# Patient Record
Sex: FEMALE | Race: WHITE
[De-identification: names, ages, dates, MRNs, and addresses within clinical notes are randomized per-mention and may not be internally consistent; named-entity substitution may affect disease eponyms.]

---

## 2019-07-13 ENCOUNTER — HOSPITAL ENCOUNTER (INPATIENT)
Dept: HOSPITAL 74 - JER | Age: 64
LOS: 6 days | Discharge: HOME | DRG: 871 | End: 2019-07-19
Attending: INTERNAL MEDICINE | Admitting: INTERNAL MEDICINE
Payer: COMMERCIAL

## 2019-07-13 VITALS — BODY MASS INDEX: 22.4 KG/M2

## 2019-07-13 DIAGNOSIS — A60.04: ICD-10-CM

## 2019-07-13 DIAGNOSIS — D64.9: ICD-10-CM

## 2019-07-13 DIAGNOSIS — E86.1: ICD-10-CM

## 2019-07-13 DIAGNOSIS — A41.9: Primary | ICD-10-CM

## 2019-07-13 DIAGNOSIS — I27.20: ICD-10-CM

## 2019-07-13 DIAGNOSIS — J18.9: ICD-10-CM

## 2019-07-13 DIAGNOSIS — J98.11: ICD-10-CM

## 2019-07-13 DIAGNOSIS — Z87.891: ICD-10-CM

## 2019-07-13 DIAGNOSIS — R01.1: ICD-10-CM

## 2019-07-13 DIAGNOSIS — R00.0: ICD-10-CM

## 2019-07-13 DIAGNOSIS — R50.9: ICD-10-CM

## 2019-07-13 DIAGNOSIS — J90: ICD-10-CM

## 2019-07-13 DIAGNOSIS — R11.10: ICD-10-CM

## 2019-07-13 DIAGNOSIS — E87.1: ICD-10-CM

## 2019-07-13 DIAGNOSIS — I08.1: ICD-10-CM

## 2019-07-13 DIAGNOSIS — D47.3: ICD-10-CM

## 2019-07-13 DIAGNOSIS — D72.829: ICD-10-CM

## 2019-07-13 LAB
ALBUMIN SERPL-MCNC: 2.7 G/DL (ref 3.4–5)
ALP SERPL-CCNC: 112 U/L (ref 45–117)
ALT SERPL-CCNC: 49 U/L (ref 13–61)
ANION GAP SERPL CALC-SCNC: 5 MMOL/L (ref 8–16)
ANION GAP SERPL CALC-SCNC: 7 MMOL/L (ref 8–16)
ANISOCYTOSIS BLD QL: 0
APPEARANCE UR: CLEAR
APTT BLD: 31.5 SECONDS (ref 25.2–36.5)
AST SERPL-CCNC: 40 U/L (ref 15–37)
BACTERIA # UR AUTO: 9.5 /HPF
BASOPHILS # BLD: 0 % (ref 0–2)
BILIRUB SERPL-MCNC: 0.2 MG/DL (ref 0.2–1)
BILIRUB UR STRIP.AUTO-MCNC: NEGATIVE MG/DL
BNP SERPL-MCNC: 1753.2 PG/ML (ref 5–125)
BUN SERPL-MCNC: 10.2 MG/DL (ref 7–18)
BUN SERPL-MCNC: 10.4 MG/DL (ref 7–18)
CALCIUM SERPL-MCNC: 9.3 MG/DL (ref 8.5–10.1)
CALCIUM SERPL-MCNC: 9.5 MG/DL (ref 8.5–10.1)
CASTS URNS QL MICRO: 4 /LPF (ref 0–8)
CHLORIDE SERPL-SCNC: 94 MMOL/L (ref 98–107)
CHLORIDE SERPL-SCNC: 95 MMOL/L (ref 98–107)
CO2 SERPL-SCNC: 27 MMOL/L (ref 21–32)
CO2 SERPL-SCNC: 28 MMOL/L (ref 21–32)
COLOR UR: YELLOW
CREAT SERPL-MCNC: 0.6 MG/DL (ref 0.55–1.3)
CREAT SERPL-MCNC: 0.7 MG/DL (ref 0.55–1.3)
DEPRECATED RDW RBC AUTO: 12.4 % (ref 11.6–15.6)
EOSINOPHIL # BLD: 0 % (ref 0–4.5)
EPITH CASTS URNS QL MICRO: 3.3 /HPF
GLUCOSE SERPL-MCNC: 115 MG/DL (ref 74–106)
GLUCOSE SERPL-MCNC: 129 MG/DL (ref 74–106)
HCT VFR BLD CALC: 29.7 % (ref 32.4–45.2)
HGB BLD-MCNC: 10.2 GM/DL (ref 10.7–15.3)
INR BLD: 1.19 (ref 0.83–1.09)
KETONES UR QL STRIP: (no result)
LEUKOCYTE ESTERASE UR QL STRIP.AUTO: NEGATIVE
LYMPHOCYTES # BLD: 3.3 % (ref 8–40)
MACROCYTES BLD QL: (no result)
MCH RBC QN AUTO: 32.1 PG (ref 25.7–33.7)
MCHC RBC AUTO-ENTMCNC: 34.5 G/DL (ref 32–36)
MCV RBC: 93.1 FL (ref 80–96)
MONOCYTES # BLD AUTO: 1.9 % (ref 3.8–10.2)
NEUTROPHILS # BLD: 94.8 % (ref 42.8–82.8)
NITRITE UR QL STRIP: NEGATIVE
OVALOCYTES BLD QL SMEAR: (no result)
PH UR: 7 [PH] (ref 5–8)
PLATELET # BLD AUTO: 330 K/MM3 (ref 134–434)
PLATELET BLD QL SMEAR: NORMAL
PMV BLD: 8 FL (ref 7.5–11.1)
POTASSIUM SERPLBLD-SCNC: 3.9 MMOL/L (ref 3.5–5.1)
POTASSIUM SERPLBLD-SCNC: 4.4 MMOL/L (ref 3.5–5.1)
PROT SERPL-MCNC: 6.2 G/DL (ref 6.4–8.2)
PROT UR QL STRIP: (no result)
PROT UR QL STRIP: NEGATIVE
PT PNL PPP: 14.1 SEC (ref 9.7–13)
RBC # BLD AUTO: 3.19 M/MM3 (ref 3.6–5.2)
RBC # BLD AUTO: 6 /HPF (ref 0–4)
SODIUM SERPL-SCNC: 128 MMOL/L (ref 136–145)
SODIUM SERPL-SCNC: 128 MMOL/L (ref 136–145)
SP GR UR: 1.02 (ref 1.01–1.03)
UROBILINOGEN UR STRIP-MCNC: 1 MG/DL (ref 0.2–1)
WBC # BLD AUTO: 11.8 K/MM3 (ref 4–10)
WBC # UR AUTO: 2 /HPF (ref 0–5)

## 2019-07-13 NOTE — PDOC
Documentation entered by Christen Ruth SCRIBE, acting as scribe for Alex Obregon MD.








Alex Obregon MD:  This documentation has been prepared by the Flory westbrook Brenda, SCRIBE, under my direction and personally reviewed by me in its 

entirety.  I confirm that the documentation accurately reflects all work, 

treatment, procedures, and medical decision making performed by me.  





Attending Attestation





- Resident


Resident Name: iRcardo Keller





- ED Attending Attestation


I have performed the following: I have examined & evaluated the patient, The 

case was reviewed & discussed with the resident, I agree w/resident's findings 

& plan, Exceptions are as noted





- HPI


HPI: 





07/13/19 12:32


The patient is a 63 year old female, with a significant PMH of Scarlet fever, 

vagincal herpies and an unknown murmor, who presents to the emergency 

department with 1 week of body aches,headaches, chest congestion,  a productive 

cough of white phlegm accompanied by chills, SOB and orthopnea. She also 

endorses experiencing fevers at about 101.5 F and this morning at 102.5 f. The 

patient reported seeing her PCP on Thursday for her symptoms,but was told to 

wait it off, due to son experiencing similar symptoms, but prescribed her Z-

pack. She also reports recent decreased urine production, for which she took 

husbands lasix, which helped for 1 day.  The patient also endorses recent 

loose stools. 





The patient denies chest pain and dizziness. Denies nausea, vomiting, and 

constipation. Denies any urinary symptoms. 





Allergies: NKA


Past surgical history: Appendectomy


Social history: Former Smoker, works as a  in [employer]. 


PCP: Christen Jimenez








- Physicial Exam


PE: 





07/13/19 12:33


GENERAL: + Mild tachypnea. The patient is awake, alert, and fully oriented, 

Nontoxic - in no acute distress.


HEAD: Normocephalic, atraumatic.


EYES: extraocular movements intact, sclera anicteric, conjunctiva clear.


ENT: Normal voice,  Moist mucous membranes.


NECK: Normal range of motion, supple without lymphadenopathy, JVD, or masses.


LUNGS: + basilar rales, worse in L base 


HEART: +Tachycardic. Regular rhythm, normal S1 and S2 without murmur, rub or 

gallop.


ABDOMEN: Soft, nontender, No guarding, no rebound.  No masses.


EXTREMITIES: Normal range of motion, no edema.  neg homans, no calf tenderness


NEUROLOGICAL: No facial asymmetry, Normal speech, normal gait. moving all 4 ext 

spontaneously and symmetrically 


PSYCH: Normal mood, normal affect.


SKIN: Warm, Dry, normal turgor, no rashes or lesions noted.











- Medical Decision Making





07/13/19 12:12


63y F no significant pmhx presnts with 1 week of fever/chills, cough, chgest 

congestion. cough producive of yellowish without hemopytsis. patient notes that 

she started off with fever, body aches approximate 6 days ago with onset of 

cough about 5 days ago - patient note poor appetite, malaise, dyspnea on 

exertion. the patient denies any leg swelling, abdominal pain, vomiting. no 

recent travel. she has had a few friends that had a mild cough.  Patient denies 

smoking or recreational drug use





pmd: dr. jimenez





Suspect possible pneumonia


we'll obtain blood work, chest x-ray, sepsis or set obtained








07/13/19 13:08


Chest x-ray noted for left basal infiltrate





We'll treat the patient with ceftriaxone and azithromycin for CAP





will adimt for inpt tx of pna per PSI Port score








**Heart Score/ECG Review





- ECG Impressions


Comment:: 





07/13/19 13:08


Twelve-lead EKG was performed and reviewed by me. 


There is normal sinus rhythm with a rate of 118


normal axis


no st wave changes suggestive of acute ischemia


imp: sinus tachycarda

## 2019-07-13 NOTE — HP
CHIEF COMPLAINT:cough, SOB





PCP:Dr. Jimenez





HISTORY OF PRESENT ILLNESS:


Patient is a 63 year old female with past medical history of heart murmur and 

vaginal herpes, presented to the ED due to 1 week history of cough and 

worsening SOB. Patient reported she experienced nonproductive cough, body 

malaise, subjective fevers and headache that started 1 week ago. No medications 

taken, no consult was done. Four days ago, she started experiencing shortness 

of breath, worsened with exertion, and had poor oral intake. She also started 

to sleep with head elevated as lying flat makes her cough and dyspneic. Two 

days ago, she followed up with her PCP, and she was prescribed Azithromycin and 

Prednisone taper which she started taking yesterday. This morning, patient woke 

up feeling worse, with temp at 102F, and feeling short of breath. She called 

her PCP and recommended she come to the ED. 


Of note, patient also reports decreased urine output for the past 2 days. She 

reports bloating and incomplete bladder emptying sensation, but denies dysuria 

or hematuria. She took Lasix 20mg from her , and reported good urine 

output afterwards, but today, urine output is decreased again. 





ER course was notable for:


(1)CXR: LLL infiltrates. RUL infiltrate vs atelectasis


(2)IV Azithromycin and IV Ceftriaxone x1, IV NS x1


(3)WBC 11.8, Na 128, BNP 1753 





Recent Travel:denies





PAST MEDICAL HISTORY:


unknown heart murmur


vaginal herpes





PAST SURGICAL HISTORY:


appendectomy





Social History:


Smoking:denies


Alcohol:1 glass of wine/day


Drugs: denies





Family History:


Mother, aunt - colon cancer





Allergies





No Known Allergies Allergy (Verified 07/13/19 09:59)


 








HOME MEDICATIONS:


REVIEW OF SYSTEMS


CONSTITUTIONAL:  fever, generalized weakness, malaise, loss of appetite


Absent: chills, diaphoresis, weight change


HEENT: 


Absent:  rhinorrhea, nasal congestion, throat pain, throat swelling, difficulty 

swallowing, mouth swelling, ear pain, eye pain, visual changes


CARDIOVASCULAR: 


Absent: chest pain, syncope, palpitations, irregular heart rate, lightheadedness

, peripheral edema


RESPIRATORY: cough, shortness of breath


Absent: dyspnea with exertion, orthopnea, wheezing, stridor, hemoptysis


GASTROINTESTINAL:


Absent: abdominal pain, abdominal distension, nausea, vomiting, diarrhea, 

constipation, melena, hematochezia


GENITOURINARY: incomplete bladder emptying


Absent: dysuria, frequency, urgency, hesitancy, hematuria, flank pain, genital 

pain


MUSCULOSKELETAL: 


Absent: myalgia, arthralgia, joint swelling, back pain, neck pain


SKIN: 


Absent: rash, itching, pallor


HEMATOLOGIC/IMMUNOLOGIC: 


Absent: easy bleeding, easy bruising, lymphadenopathy, frequent infections


ENDOCRINE:


Absent: unexplained weight gain, unexplained weight loss, heat intolerance, 

cold intolerance


NEUROLOGIC: 


Absent: headache, focal weakness or paresthesias, dizziness, unsteady gait, 

seizure, mental status changes, bladder or bowel incontinence


PSYCHIATRIC: 


Absent: anxiety, depression, suicidal or homicidal ideation, hallucinations.








PHYSICAL EXAMINATION


 Vital Signs - 24 hr











  07/13/19





  09:59


 


Temperature 101.9 F H


 


Pulse Rate 125 H


 


Respiratory 25 H





Rate 


 


Blood Pressure 147/77


 


O2 Sat by Pulse 97





Oximetry (%) 











GENERAL: Awake, alert, and fully oriented, in no acute distress.


EYES: PERRLa, EOMI, sclera anicteric, conjunctiva clear.


EARS, NOSE, THROAT: Dry mucous membranes.


NECK: Normal range of motion, supple.


LUNGS: + fine crackles on left base


HEART: Regular rate and rhythm, normal S1 and S2, +holosystolic murmur RUSB/LUSB


ABDOMEN: Soft, nontender, not distended, normoactive bowel sounds. 


MUSCULOSKELETAL: Normal range of motion at all joints. 


UPPER EXTREMITIES: 2+ pulses, warm, well-perfused. No peripheral edema.


LOWER EXTREMITIES: 2+ pulses, warm, well-perfused. No peripheral edema. 


NEUROLOGICAL:  Cranial nerves II-XII intact. Normal speech. Normal gait.


PSYCHIATRIC: Cooperative. Good eye contact. Appropriate mood and affect.


SKIN: Warm, dry, normal turgor, no rashes or lesions noted.








 Laboratory Results - last 24 hr











  07/13/19 07/13/19 07/13/19





  11:00 11:00 11:00


 


WBC  11.8 H  


 


RBC  3.19 L  


 


Hgb  10.2 L  


 


Hct  29.7 L  


 


MCV  93.1  


 


MCH  32.1  


 


MCHC  34.5  


 


RDW  12.4  


 


Plt Count  330  


 


MPV  8.0  


 


Absolute Neuts (auto)  11.2 H  


 


Neutrophils %  94.8 H  


 


Lymphocytes %  3.3 L  


 


Monocytes %  1.9 L  


 


Eosinophils %  0.0  


 


Basophils %  0.0  


 


Nucleated RBC %  0  


 


PT with INR   14.10 H 


 


INR   1.19 H 


 


PTT (Actin FS)   31.5 


 


Sodium    128 L


 


Potassium    3.9


 


Chloride    94 L


 


Carbon Dioxide    27


 


Anion Gap    7 L


 


BUN    10.2


 


Creatinine    0.6


 


Est GFR (CKD-EPI)AfAm    114.02


 


Est GFR (CKD-EPI)NonAf    98.38


 


Random Glucose    115 H


 


Lactic Acid   


 


Calcium    9.5


 


Total Bilirubin    0.2


 


AST    40 H


 


ALT    49


 


Alkaline Phosphatase    112


 


Troponin I   


 


B-Natriuretic Peptide   


 


Total Protein    6.2 L


 


Albumin    2.7 L


 


Urine Color   


 


Urine Appearance   


 


Urine pH   


 


Ur Specific Gravity   


 


Urine Protein   


 


Urine Glucose (UA)   


 


Urine Ketones   


 


Urine Blood   


 


Urine Nitrite   


 


Urine Bilirubin   


 


Urine Urobilinogen   


 


Ur Leukocyte Esterase   


 


Urine WBC (Auto)   


 


Urine RBC (Auto)   


 


Urine Casts (Auto)   


 


U Epithel Cells (Auto)   


 


Urine Bacteria (Auto)   














  07/13/19 07/13/19 07/13/19





  11:00 11:00 11:20


 


WBC   


 


RBC   


 


Hgb   


 


Hct   


 


MCV   


 


MCH   


 


MCHC   


 


RDW   


 


Plt Count   


 


MPV   


 


Absolute Neuts (auto)   


 


Neutrophils %   


 


Lymphocytes %   


 


Monocytes %   


 


Eosinophils %   


 


Basophils %   


 


Nucleated RBC %   


 


PT with INR   


 


INR   


 


PTT (Actin FS)   


 


Sodium   


 


Potassium   


 


Chloride   


 


Carbon Dioxide   


 


Anion Gap   


 


BUN   


 


Creatinine   


 


Est GFR (CKD-EPI)AfAm   


 


Est GFR (CKD-EPI)NonAf   


 


Random Glucose   


 


Lactic Acid  0.9  


 


Calcium   


 


Total Bilirubin   


 


AST   


 


ALT   


 


Alkaline Phosphatase   


 


Troponin I   < 0.02 


 


B-Natriuretic Peptide   1753.2 H 


 


Total Protein   


 


Albumin   


 


Urine Color    Yellow


 


Urine Appearance    Clear


 


Urine pH    7.0


 


Ur Specific Gravity    1.021


 


Urine Protein    2+ H


 


Urine Glucose (UA)    Negative


 


Urine Ketones    Trace H


 


Urine Blood    Negative


 


Urine Nitrite    Negative


 


Urine Bilirubin    Negative


 


Urine Urobilinogen    1.0


 


Ur Leukocyte Esterase    Negative


 


Urine WBC (Auto)    2


 


Urine RBC (Auto)    6


 


Urine Casts (Auto)    4


 


U Epithel Cells (Auto)    3.3


 


Urine Bacteria (Auto)    9.5











ASSESSMENT/PLAN:


Patient is a 63 year old female with past medical history of heart murmur and 

vaginal herpes, presented to the ED due to 1 week history of cough and 

worsening SOB. 





#Sepsis 2/2 Community Acquired Pneumonia


-Leukocytosis, fever, tachycardia


-CXR: LLL infiltrate, ?RUL infiltrate


-Urine legionella


-Continue Iv Ceftriaxone 1000mg daily and IV Azithromycin 500mg daily


-IV NS @100cc/hr


-Follow up cultures





#Hyponatremia


-likely 2/2 poor oral intake vs infection


-Urine Na, crea, osm


-Will replete with IV NS 


-Monitor Na level





#Decreased urine output


-Will order bladder scan post void


-likely 2/2 poor oral intake





#?heart failure


-Will order echo


-BNP 1700


-clinical picture more consistent with PNA than CHF





#FEN


-IV NS @100cc/hr


-Routine bmp monitoring


-Sodium restricted diet





#Prophylaxis


-Lovenox 40mg sq daily





#Disposition


-full code


-admit to med surg





Visit type





- Emergency Visit


Emergency Visit: Yes


ED Registration Date: 07/13/19


Care time: The patient presented to the Emergency Department on the above date 

and was hospitalized for further evaluation of their emergent condition.





- New Patient


This patient is new to me today: Yes


Date on this admission: 07/13/19





- Critical Care


Critical Care patient: No





ATTENDING PHYSICIAN STATEMENT





I saw and evaluated the patient.


I reviewed the resident's note and discussed the case with the resident.


I agree with the resident's findings and plan as documented.








SUBJECTIVE:








OBJECTIVE:








ASSESSMENT AND PLAN:

## 2019-07-13 NOTE — PDOC
History of Present Illness





- General


Chief Complaint: SIRS, Suspected/Possible


Stated Complaint: PCP SENT


Time Seen by Provider: 07/13/19 10:41


History Source: Patient


Exam Limitations: No Limitations





- History of Present Illness


Initial Comments: 





07/13/19 10:46


64 yo female pmh scarlet fever and vaginal herpes 





presents for 1 week body aches, HA, F/C, cough with yellow fl, SOB with talking

, worse lying flat, decreased urine output even though drinking water. took 

husbands lasix 20 pm 





102.5 t max





Prescribed z pack and prednisone. Took 1 dose medrol pack, no antibiotics taken 

yet





son had similar symptoms 1 week ago, no recent travel





tachy, bilateral crackles, 101.9 temp in the ED




















Past History





- Past Medical History


Allergies/Adverse Reactions: 


 Allergies











Allergy/AdvReac Type Severity Reaction Status Date / Time


 


No Known Allergies Allergy   Verified 07/13/19 09:59











COPD: No


Other medical history: herpies





- Surgical History


Appendectomy: Yes





- Suicide/Smoking/Psychosocial Hx


Smoking History: Former smoker


Have you smoked in the past 12 months: No


Information on smoking cessation initiated: No


Hx Alcohol Use: No


Drug/Substance Use Hx: No





*Physical Exam





- Vital Signs


 Last Vital Signs











Temp Pulse Resp BP Pulse Ox


 


 101.9 F H  125 H  25 H  147/77   97 


 


 07/13/19 09:59  07/13/19 09:59  07/13/19 09:59  07/13/19 09:59  07/13/19 09:59














ED Treatment Course





- LABORATORY


CBC & Chemistry Diagram: 


 07/13/19 11:00





 07/13/19 11:00





*DC/Admit/Observation/Transfer


Diagnosis at time of Disposition: 


 Community acquired bacterial pneumonia








- Discharge Dispostion


Condition at time of disposition: Stable


Decision to Admit order: Yes





- Referrals


Referrals: 


Christen Jimenez MD [Primary Care Provider] - 





- Patient Instructions





- Post Discharge Activity

## 2019-07-13 NOTE — EKG
Test Reason : 

Blood Pressure : ***/*** mmHG

Vent. Rate : 118 BPM     Atrial Rate : 118 BPM

   P-R Int : 154 ms          QRS Dur : 068 ms

    QT Int : 286 ms       P-R-T Axes : 046 037 037 degrees

   QTc Int : 400 ms

 

SINUS TACHYCARDIA

POSSIBLE LEFT ATRIAL ENLARGEMENT

BORDERLINE ECG

NO PREVIOUS ECGS AVAILABLE

Confirmed by FREDRICK DEMPSEY, RISHI ([id number]) on 7/13/2019 4:09:05 PM

 

Referred By:             Confirmed By:RISHI ALCALA MD

## 2019-07-13 NOTE — PN
Teaching Attending Note


Name of Resident: Heather Westbrook





ATTENDING PHYSICIAN STATEMENT





I saw and evaluated the patient.


I reviewed the resident's note and discussed the case with the resident.


I agree with the resident's findings and plan as documented.








SUBJECTIVE:


CC: fever, cough, and SOB. 


HPI: 64 y/o lady with h/o heart murmur,anemia, and genital herpes who presented 

with fever, cough, and SOB x 1 week. 





patient was doing well until she started with malaise, SOB, cough ( 

occasionally with yellow sputum production). diarrhea developed on second day 

of sx onset, after taking a natural remedies for constipation and has been 

having 3-4 water BMs daily. she feels bloated. she reports decreased urine 

output.  Tokk a lsix form her  yesterday yesterday with transient 

increase in her urine. she denies hematuria. she reports increased thirst and 

slightly increased water intake in past week. poor  food intake. denies any 

heart history but knows of a murmur since childhood. Had previous echos in past 

and was told "the murmur is minimal".


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 Son was sick with fever last week for few 

days then it resolved. no other sick contact. 


she saw her PCp on Thursday and was prescribed Zpack and prednisone. she took 

those yesterday. No change in status though. this am , she felt worse nad 

presented to ER. send sputum cx 





In ER, she was given IVF,, CTx and Azithro. blood cx was sent. she feels better 

now and SOB is slightly better.








OBJECTIVE:


NAD. 


HEENT: dry MM, + JVD, no facial droop. erythematous oropharynx, no facial droop

, round pupils, reactive to light, unicteric sclera. EOMI


CV: RRR, 2/6 Sm at Base, and LLSB, with no radiation to carotids . B/l JVD 


Lungs: L mid lung and L base crackles. R base crackles 


Ext: no nellie or erythema, no fungal infection in toe webs. 


Abd:Soft, Nd,, TTP in epigastric area and RUQ. No rebound tenderness. No 

hepartosplenomegaly. blader is not percussed or palpated. 








Cxray image and report reviewed. 


EKG: sinus tach, no ST or TW changes. 





ASSESSMENT AND PLAN:





64 y/o lady with h/o heart murmur,anemia, and genital herpes who presented with 

fever, cough, and SOB x 1 week. she was found ot be septic 





1- Sepsis: due to b/l community acquired  PNA. R/o legionella with bilateral 

infiltrates, hyponatremia and tenderness over liver area. 


Despite JVds, and elevated BNP, patient looks volume depleted. i doubt heart 

failure. 


- cont ceftriaxone and Azithro. Qtc 400


- check legionella Urine Ag.


- follow urine cx 


- give IVF carefully and monitor resp status. if signs of volume overload can 

diurese 


- check Echo


- order sputum cx 





2- Hyponatremia: suspect form volume depletion and poor po intake. r/o 

legionella infection. doubt CHF. No previous labsin system


- check urine Na/cr/Osm, and Serum Osm. 


- IVF NS for now 


- repeat Na this evening 





3- Normocytic anemia: reports being anemic in past, but no specific treatment 

was provided. No GI or  bleed 


- check iron studies, B12, folate 





4- Elevated BNP: with JVd on exam. doubt CHF. 


- check echo. monitor closely on IVF





5- Reported decreased urine output. probably due to volume depletion.No  signs 

of UTI 


- check bladder scan. R/o retention 


- I&O 


 





6- DVT px. lovenox

## 2019-07-14 LAB
ALBUMIN SERPL-MCNC: 2.5 G/DL (ref 3.4–5)
ALP SERPL-CCNC: 96 U/L (ref 45–117)
ALT SERPL-CCNC: 49 U/L (ref 13–61)
ANION GAP SERPL CALC-SCNC: 6 MMOL/L (ref 8–16)
AST SERPL-CCNC: 56 U/L (ref 15–37)
BASOPHILS # BLD: 0.2 % (ref 0–2)
BILIRUB SERPL-MCNC: 0.2 MG/DL (ref 0.2–1)
BUN SERPL-MCNC: 8.9 MG/DL (ref 7–18)
CALCIUM SERPL-MCNC: 9.1 MG/DL (ref 8.5–10.1)
CHLORIDE SERPL-SCNC: 100 MMOL/L (ref 98–107)
CO2 SERPL-SCNC: 29 MMOL/L (ref 21–32)
CREAT SERPL-MCNC: 0.6 MG/DL (ref 0.55–1.3)
DEPRECATED RDW RBC AUTO: 12.3 % (ref 11.6–15.6)
EOSINOPHIL # BLD: 0.3 % (ref 0–4.5)
GLUCOSE SERPL-MCNC: 103 MG/DL (ref 74–106)
HCT VFR BLD CALC: 29.9 % (ref 32.4–45.2)
HGB BLD-MCNC: 10.2 GM/DL (ref 10.7–15.3)
LYMPHOCYTES # BLD: 9.9 % (ref 8–40)
MAGNESIUM SERPL-MCNC: 2.2 MG/DL (ref 1.8–2.4)
MCH RBC QN AUTO: 32.3 PG (ref 25.7–33.7)
MCHC RBC AUTO-ENTMCNC: 34.2 G/DL (ref 32–36)
MCV RBC: 94.4 FL (ref 80–96)
MONOCYTES # BLD AUTO: 4.6 % (ref 3.8–10.2)
NEUTROPHILS # BLD: 85 % (ref 42.8–82.8)
PHOSPHATE SERPL-MCNC: 3 MG/DL (ref 2.5–4.9)
PLATELET # BLD AUTO: 329 K/MM3 (ref 134–434)
PMV BLD: 7.3 FL (ref 7.5–11.1)
POTASSIUM SERPLBLD-SCNC: 3.7 MMOL/L (ref 3.5–5.1)
PROT SERPL-MCNC: 5.9 G/DL (ref 6.4–8.2)
RBC # BLD AUTO: 3.16 M/MM3 (ref 3.6–5.2)
SODIUM SERPL-SCNC: 135 MMOL/L (ref 136–145)
WBC # BLD AUTO: 7.5 K/MM3 (ref 4–10)

## 2019-07-14 RX ADMIN — ENOXAPARIN SODIUM SCH MG: 40 INJECTION SUBCUTANEOUS at 09:59

## 2019-07-14 RX ADMIN — SODIUM CHLORIDE SCH MLS/HR: 9 INJECTION, SOLUTION INTRAVENOUS at 14:00

## 2019-07-14 RX ADMIN — AZITHROMYCIN DIHYDRATE SCH MLS/HR: 500 INJECTION, POWDER, LYOPHILIZED, FOR SOLUTION INTRAVENOUS at 09:59

## 2019-07-14 RX ADMIN — GUAIFENESIN AND CODEINE PHOSPHATE PRN ML: 10; 100 LIQUID ORAL at 21:46

## 2019-07-14 RX ADMIN — IPRATROPIUM BROMIDE AND ALBUTEROL SULFATE PRN AMP: .5; 3 SOLUTION RESPIRATORY (INHALATION) at 16:00

## 2019-07-14 RX ADMIN — IPRATROPIUM BROMIDE AND ALBUTEROL SULFATE PRN AMP: .5; 3 SOLUTION RESPIRATORY (INHALATION) at 20:30

## 2019-07-14 RX ADMIN — GUAIFENESIN AND CODEINE PHOSPHATE PRN ML: 10; 100 LIQUID ORAL at 14:26

## 2019-07-14 NOTE — PN
Progress Note (short form)





- Note


Progress Note: 





Paged by nurse that patient was febrile at 103F. Patient reported coughing 

which keeps her awake.


Patient noted to be tachycardic at 120s.





General: awake, alert, cotinuously having nonproductive cough


Heart: Tachycardic


Lungs: crackles left lung base 





Repeat BNP and bmp 


EKG showed sinus tachycardia


IVF discontinued


Repeat blood culture

## 2019-07-14 NOTE — EKG
Test Reason : 

Blood Pressure : ***/*** mmHG

Vent. Rate : 102 BPM     Atrial Rate : 102 BPM

   P-R Int : 160 ms          QRS Dur : 074 ms

    QT Int : 308 ms       P-R-T Axes : 049 026 053 degrees

   QTc Int : 401 ms

 

SINUS TACHYCARDIA WITH OCCASIONAL PREMATURE VENTRICULAR COMPLEXES

OTHERWISE NORMAL ECG

WHEN COMPARED WITH ECG OF 13-JUL-2019 10:26,

PREMATURE VENTRICULAR COMPLEXES ARE NOW PRESENT

Confirmed by FREDRICK DEMPSEY, RISHI ([id number]) on 7/14/2019 11:52:59 AM

 

Referred By:             Confirmed By:RISHI ALCALA MD

## 2019-07-14 NOTE — PN
Progress Note (short form)





- Note


Progress Note: 





Subjective:


Had fever last night. feels SOB still, cough is bothering her , but did not 

change from yesterday.  No CP . 





Objective:








Vital Signs:


 Last Vital Signs











Temp Pulse Resp BP Pulse Ox


 


 98.8 F   92 H  20   111/71   95 


 


 07/14/19 11:15  07/14/19 11:15  07/14/19 11:15  07/14/19 11:15  07/14/19 09:00








 Laboratory Results - last 24 hr











  07/13/19 07/13/19 07/13/19





  11:00 11:20 15:46


 


WBC   


 


RBC   


 


Hgb   


 


Hct   


 


MCV   


 


MCH   


 


MCHC   


 


RDW   


 


Plt Count   


 


MPV   


 


Absolute Neuts (auto)   


 


Neutrophils %   


 


Neutrophils % (Manual)  87.0 H  


 


Band Neutrophils %  5.0  


 


Lymphocytes %   


 


Lymphocytes % (Manual)  4.0 L  


 


Monocytes %   


 


Monocytes % (Manual)  3 L  


 


Eosinophils %   


 


Eosinophils % (Manual)  0.0  


 


Basophils %   


 


Basophils % (Manual)  0.0  


 


Myelocytes % (Man)  0  


 


Promyelocytes % (Man)  0  


 


Blast Cells % (Manual)  0  


 


Nucleated RBC %   


 


Metamyelocytes  0  


 


Hypochromia  0  


 


Platelet Estimate  Normal  


 


Polychromasia  0  


 


Poikilocytosis  0  


 


Anisocytosis  0  


 


Microcytosis  0  


 


Macrocytosis  1+  


 


Ovalocytes  1+  


 


Sodium   


 


Potassium   


 


Chloride   


 


Carbon Dioxide   


 


Anion Gap   


 


BUN   


 


Creatinine   


 


Est GFR (CKD-EPI)AfAm   


 


Est GFR (CKD-EPI)NonAf   


 


Random Glucose   


 


Serum Osmolality   


 


Lactic Acid    1.2


 


Calcium   


 


Phosphorus   


 


Magnesium   


 


Total Bilirubin   


 


AST   


 


ALT   


 


Alkaline Phosphatase   


 


B-Natriuretic Peptide   


 


Total Protein   


 


Albumin   


 


TSH   


 


Urine Osmolality   


 


Ur Random Creatinine   37.0 


 


Ur Random Sodium   














  07/13/19 07/13/19 07/13/19





  15:46 16:35 16:35


 


WBC   


 


RBC   


 


Hgb   


 


Hct   


 


MCV   


 


MCH   


 


MCHC   


 


RDW   


 


Plt Count   


 


MPV   


 


Absolute Neuts (auto)   


 


Neutrophils %   


 


Neutrophils % (Manual)   


 


Band Neutrophils %   


 


Lymphocytes %   


 


Lymphocytes % (Manual)   


 


Monocytes %   


 


Monocytes % (Manual)   


 


Eosinophils %   


 


Eosinophils % (Manual)   


 


Basophils %   


 


Basophils % (Manual)   


 


Myelocytes % (Man)   


 


Promyelocytes % (Man)   


 


Blast Cells % (Manual)   


 


Nucleated RBC %   


 


Metamyelocytes   


 


Hypochromia   


 


Platelet Estimate   


 


Polychromasia   


 


Poikilocytosis   


 


Anisocytosis   


 


Microcytosis   


 


Macrocytosis   


 


Ovalocytes   


 


Sodium   


 


Potassium   


 


Chloride   


 


Carbon Dioxide   


 


Anion Gap   


 


BUN   


 


Creatinine   


 


Est GFR (CKD-EPI)AfAm   


 


Est GFR (CKD-EPI)NonAf   


 


Random Glucose   


 


Serum Osmolality  259 L  


 


Lactic Acid   


 


Calcium   


 


Phosphorus   


 


Magnesium   


 


Total Bilirubin   


 


AST   


 


ALT   


 


Alkaline Phosphatase   


 


B-Natriuretic Peptide   


 


Total Protein   


 


Albumin   


 


TSH   


 


Urine Osmolality    209 L


 


Ur Random Creatinine   


 


Ur Random Sodium   < 18 L 














  07/13/19 07/14/19 07/14/19





  18:32 01:55 07:31


 


WBC   


 


RBC   


 


Hgb   


 


Hct   


 


MCV   


 


MCH   


 


MCHC   


 


RDW   


 


Plt Count   


 


MPV   


 


Absolute Neuts (auto)   


 


Neutrophils %   


 


Neutrophils % (Manual)   


 


Band Neutrophils %   


 


Lymphocytes %   


 


Lymphocytes % (Manual)   


 


Monocytes %   


 


Monocytes % (Manual)   


 


Eosinophils %   


 


Eosinophils % (Manual)   


 


Basophils %   


 


Basophils % (Manual)   


 


Myelocytes % (Man)   


 


Promyelocytes % (Man)   


 


Blast Cells % (Manual)   


 


Nucleated RBC %   


 


Metamyelocytes   


 


Hypochromia   


 


Platelet Estimate   


 


Polychromasia   


 


Poikilocytosis   


 


Anisocytosis   


 


Microcytosis   


 


Macrocytosis   


 


Ovalocytes   


 


Sodium  128 L  Cancelled  135 L


 


Potassium  4.4  Cancelled  3.7


 


Chloride  95 L  Cancelled  100


 


Carbon Dioxide  28  Cancelled  29


 


Anion Gap  5 L  Cancelled  6 L


 


BUN  10.4  Cancelled  8.9


 


Creatinine  0.7  Cancelled  0.6


 


Est GFR (CKD-EPI)AfAm  108.38  Cancelled  114.02


 


Est GFR (CKD-EPI)NonAf  93.51  Cancelled  98.38


 


Random Glucose  129 H  Cancelled  103


 


Serum Osmolality   


 


Lactic Acid   


 


Calcium  9.3  Cancelled  9.1


 


Phosphorus    3.0


 


Magnesium    2.2


 


Total Bilirubin    0.2


 


AST    56 H


 


ALT    49


 


Alkaline Phosphatase    96


 


B-Natriuretic Peptide   Cancelled 


 


Total Protein    5.9 L


 


Albumin    2.5 L


 


TSH    0.61


 


Urine Osmolality   


 


Ur Random Creatinine   


 


Ur Random Sodium   














  07/14/19





  07:33


 


WBC  7.5


 


RBC  3.16 L


 


Hgb  10.2 L


 


Hct  29.9 L


 


MCV  94.4


 


MCH  32.3


 


MCHC  34.2


 


RDW  12.3


 


Plt Count  329


 


MPV  7.3 L


 


Absolute Neuts (auto)  6.4


 


Neutrophils %  85.0 H


 


Neutrophils % (Manual) 


 


Band Neutrophils % 


 


Lymphocytes %  9.9  D


 


Lymphocytes % (Manual) 


 


Monocytes %  4.6  D


 


Monocytes % (Manual) 


 


Eosinophils %  0.3  D


 


Eosinophils % (Manual) 


 


Basophils %  0.2  D


 


Basophils % (Manual) 


 


Myelocytes % (Man) 


 


Promyelocytes % (Man) 


 


Blast Cells % (Manual) 


 


Nucleated RBC %  0


 


Metamyelocytes 


 


Hypochromia 


 


Platelet Estimate 


 


Polychromasia 


 


Poikilocytosis 


 


Anisocytosis 


 


Microcytosis 


 


Macrocytosis 


 


Ovalocytes 


 


Sodium 


 


Potassium 


 


Chloride 


 


Carbon Dioxide 


 


Anion Gap 


 


BUN 


 


Creatinine 


 


Est GFR (CKD-EPI)AfAm 


 


Est GFR (CKD-EPI)NonAf 


 


Random Glucose 


 


Serum Osmolality 


 


Lactic Acid 


 


Calcium 


 


Phosphorus 


 


Magnesium 


 


Total Bilirubin 


 


AST 


 


ALT 


 


Alkaline Phosphatase 


 


B-Natriuretic Peptide 


 


Total Protein 


 


Albumin 


 


TSH 


 


Urine Osmolality 


 


Ur Random Creatinine 


 


Ur Random Sodium 











Physical Exam:


NAD. 


HEENT: dry MM, No JVD today. 


CV: RRR, 2/6 SM at Base, and LLSB, with no radiation to carotids. 


Lungs: L mid lung and L base crackles.No R sided crackles 


Ext: no edema or erythema. 


Abd:Soft, Nd,NT. 











ASSESSMENT AND PLAN:





64 y/o lady with h/o heart murmur,anemia, and genital herpes who presented with 

fever, cough, and SOB x 1 week. she was found ot be septic 





1- Sepsis: due to b/l community acquired  PNA. no improvement in status 


- r/o legionella, U legionela AG pending 


- repeat Cxray stat


- hold fluids for now and decide after reviewing cxray 


- cont Azithro and ceftriaxone 


- follow blood cx 


- follow sputum cx if able to obtain 


- cont FiO2 


- check Echo 


- pulm consult 


- d/w Dr. Mcfarland: check for Pertusis








2- Hyponatremia: corrected with IVF. this indicates volume depletion as 

etiology not heart failure





3- Normocytic anemia: reports being anemic in past, but no specific treatment 

was provided. No GI or  bleed 


- check iron studies, B12, folate. 





4- Elevated BNP: doubt heart failure as her Na corrected with IVF. lungs with 

no increased crackles with fluids. JVD that was present yesterday has resolved. 


- echo pending 


- didier for I& O





5- DVT px. lovenox





Tx to tele. 





Visit type





- Emergency Visit


Emergency Visit: Yes


ED Registration Date: 07/13/19


Care time: The patient presented to the Emergency Department on the above date 

and was hospitalized for further evaluation of their emergent condition.





- New Patient


This patient is new to me today: No





- Critical Care


Critical Care patient: No

## 2019-07-14 NOTE — CON.PULM
Consult


Consult Specialty:: PULMONARY


Referred by:: PMD


Reason for Consultation:: PNEUMONIA





- History of Present Illness


Chief Complaint: PNEUMONIA


History of Present Illness: 





63 WHITE FEMALE NO SIGNIFICANT SMOKING HISTORY PRESENTS TO ED WITH FEVER/CHILLS/

DRY COUGH/SOB FOR ONE WEEK.


SHE STATES THE SYMPTOMS BEGAN 7 DAYS AGO AFTER HAVING A MESSAGE AND A STEAM 

BATH. THAT EVENING SHE HAD BODY ACHES


AND FEVER WITH HEADACHE WHICH PROGRESSED OVER TOME . DOCUMENTED FEVERS UP TO 

103.  PATIENT VISITED HER PMD 4 DAYS AGO AND WAS TOLD SHE HAD A VIRAL ILLNESS. 

SHE HAD BLOOD DRAWN BUT WAS NOT GIVEN THE RESULTS, NO RADIOGRAPG WAS DONE AT 

THAT TIME. TWO DAYS AGO SHE CALLED HER PMD WITH CONTINUED SYMPTOMS AND WAS 

GIVEN A ZPK WHICH SHE TOOK ALL THE PILLS WITHIN A 12 HOUR PERIOD. SHE CONTINUED 

TO FEEL POORLY AND DECIDED TO COME TO THE ER.


 PATIENT HAS SPENT A NUMBER OF YEARS IN [place] AND [facility] AND IS PPD POSITIVE. 

SHE WAS NEVER TOLD OF ACTIVE TBC INFECTION.





- History Source


History Provided By: Patient, Medical Record


Limitations to Obtaining History: No Limitations





- Past Medical History


CNS: No: Alzheimer's


Cardio/Vascular: No: AFIB, HTN, Hyperlipdemia


Pulmonary: No: Cancer, COPD, O2 Dependent, Pulmonary Fibrosis


Gastrointestinal: No: Cancer


Hepatobiliary: No: Cirrhosis


Renal/: No: Renal Failure


Reproductive: Yes: Postmenopausal


...Pregnant: No


Heme/Onc: No: Anemia


Infectious Disease: Yes: Other (GENITAL HERPES )


Psych: No: Addictions


Musculoskeletal: No: Bursitis


Rheumatology: No: Fibromyalgia


Endocrine: No: Hypothyroidism





- Past Surgical History


Past Surgical History: Yes: Appendectomy





- Alcohol/Substance Use


Hx Alcohol Use: No


History of Substance Use: reports: None





- Smoking History


Smoking history: Former smoker


Have you smoked in the past 12 months: No


If you are a former smoker, when did you quit?: MANY YEARS AGO





- Social History


ADL: Independent


Place of Birth: Other


History of Recent Travel: No





Home Medications





- Allergies


Allergies/Adverse Reactions: 


 Allergies











Allergy/AdvReac Type Severity Reaction Status Date / Time


 


No Known Allergies Allergy   Verified 07/13/19 09:59














Family Disease History





- Family Disease History


Family Disease History: CA: Grandparent, Father (COLON CANCER)





Review of Systems





- Review of Systems


Constitutional: reports: Fever, Loss of Appetite


Eyes: denies: Blurred Vision


HENT: denies: Difficult Swallowing


Neck: denies: Decreased ROM


Cardiovascular: reports: Shortness of Breath.  denies: Chest Pain


Respiratory: reports: Cough, Exercise Intolerance, SOB, SOB on Exertion.  denies

: Hemoptysis, Orthopnea


Gastrointestinal: reports: Abdominal Pain (MIDEPIGASTRIC PAIN), Other


Genitourinary: reports: No Symptoms


Breasts: reports: No Symptoms Reported


Musculoskeletal: reports: No Symptoms


Integumentary: reports: No Symptoms


Neurological: reports: No Symptoms





Physical Exam


Vital Sings: 


 Vital Signs











Temperature  98.8 F   07/14/19 11:15


 


Pulse Rate  92 H  07/14/19 11:15


 


Respiratory Rate  20   07/14/19 11:15


 


Blood Pressure  111/71   07/14/19 11:15


 


O2 Sat by Pulse Oximetry (%)  95   07/14/19 09:00











Constitutional: Yes: Anxious


Eyes: Yes: EOM Intact


HENT: Yes: Normocephalic


Neck: Yes: Trachea Midline


Cardiovascular: Yes: Tachycardia, S1, S2


Respiratory: Yes: Cough, Other (EGOPHONY AND BRONCHIAL BREATH SOUNDS LEFT BASE 

EXTENDING UP 1/3 LUNG FIELD)


Gastrointestinal: Yes: Soft, Tenderness, Epigastrium


Renal/: Yes: WNL


Musculoskeletal: Yes: Muscle Pain


Extremities: Yes: WNL


Edema: No


Integumentary: Yes: WNL


Neurological: Yes: WNL


Labs: 


 CBC, BMP





 07/14/19 07:33 





 07/14/19 07:31 











Imaging





- Results


Chest X-ray: Report Reviewed, Image Reviewed


EKG: Report Reviewed, Image Reviewed





Problem List





- Problems


(1) Community acquired bacterial pneumonia


Code(s): J15.9 - UNSPECIFIED BACTERIAL PNEUMONIA   





Assessment/Plan





CABP WITH HYPOXEMIA/TACHYCARDIA/ELEVATED TEMPS


RECENT STEAM BATH CONCERNING FOR LEGIONELLA


PANCULTURE/BLOOD, URINE


URINE ANTIGENS


PERTUSSIS SEROLOGY WITH NASAL SWAB PCR IF POSITIVE





WILL NEED CT CHEST NO CONTRAST IN AM


SUGGEST ID OPINION


CONTINUE ANTIBIOTICS AND SUPPLEMENTAL O2 WITH IV FLUID SUPPORT.





THANK YOU FOR THIS CONSULT.








MYRIAM BAINS MD

## 2019-07-15 LAB
ALBUMIN SERPL-MCNC: 2.3 G/DL (ref 3.4–5)
ALP SERPL-CCNC: 82 U/L (ref 45–117)
ALT SERPL-CCNC: 58 U/L (ref 13–61)
ANION GAP SERPL CALC-SCNC: 6 MMOL/L (ref 8–16)
AST SERPL-CCNC: 55 U/L (ref 15–37)
BASOPHILS # BLD: 0.5 % (ref 0–2)
BILIRUB SERPL-MCNC: 0.4 MG/DL (ref 0.2–1)
BUN SERPL-MCNC: 5.9 MG/DL (ref 7–18)
CALCIUM SERPL-MCNC: 8.6 MG/DL (ref 8.5–10.1)
CHLORIDE SERPL-SCNC: 102 MMOL/L (ref 98–107)
CO2 SERPL-SCNC: 27 MMOL/L (ref 21–32)
CREAT SERPL-MCNC: 0.4 MG/DL (ref 0.55–1.3)
DEPRECATED RDW RBC AUTO: 12.7 % (ref 11.6–15.6)
EOSINOPHIL # BLD: 3.2 % (ref 0–4.5)
GLUCOSE SERPL-MCNC: 96 MG/DL (ref 74–106)
HCT VFR BLD CALC: 27.5 % (ref 32.4–45.2)
HGB BLD-MCNC: 9.7 GM/DL (ref 10.7–15.3)
LYMPHOCYTES # BLD: 21 % (ref 8–40)
MCH RBC QN AUTO: 32.9 PG (ref 25.7–33.7)
MCHC RBC AUTO-ENTMCNC: 35.2 G/DL (ref 32–36)
MCV RBC: 93.4 FL (ref 80–96)
MONOCYTES # BLD AUTO: 8.5 % (ref 3.8–10.2)
NEUTROPHILS # BLD: 66.8 % (ref 42.8–82.8)
PLATELET # BLD AUTO: 361 K/MM3 (ref 134–434)
PMV BLD: 7 FL (ref 7.5–11.1)
POTASSIUM SERPLBLD-SCNC: 3.7 MMOL/L (ref 3.5–5.1)
PROT SERPL-MCNC: 5.4 G/DL (ref 6.4–8.2)
RBC # BLD AUTO: 2.94 M/MM3 (ref 3.6–5.2)
SERUM IRON SATURATION: 5 % (ref 15–55)
SODIUM SERPL-SCNC: 135 MMOL/L (ref 136–145)
TIBC SERPL-MCNC: 190 UG/DL (ref 250–450)
WBC # BLD AUTO: 5.9 K/MM3 (ref 4–10)

## 2019-07-15 RX ADMIN — GUAIFENESIN AND CODEINE PHOSPHATE PRN ML: 10; 100 LIQUID ORAL at 22:58

## 2019-07-15 RX ADMIN — ENOXAPARIN SODIUM SCH MG: 40 INJECTION SUBCUTANEOUS at 09:43

## 2019-07-15 RX ADMIN — PIPERACILLIN SODIUM,TAZOBACTAM SODIUM SCH MLS/HR: 3; .375 INJECTION, POWDER, FOR SOLUTION INTRAVENOUS at 09:44

## 2019-07-15 RX ADMIN — IPRATROPIUM BROMIDE AND ALBUTEROL SULFATE PRN AMP: .5; 3 SOLUTION RESPIRATORY (INHALATION) at 07:30

## 2019-07-15 RX ADMIN — SODIUM CHLORIDE SCH MLS/HR: 9 INJECTION, SOLUTION INTRAVENOUS at 16:51

## 2019-07-15 RX ADMIN — ONDANSETRON SCH MG: 2 INJECTION INTRAMUSCULAR; INTRAVENOUS at 10:11

## 2019-07-15 RX ADMIN — GUAIFENESIN AND CODEINE PHOSPHATE PRN ML: 10; 100 LIQUID ORAL at 04:37

## 2019-07-15 RX ADMIN — ONDANSETRON SCH MG: 2 INJECTION INTRAMUSCULAR; INTRAVENOUS at 22:22

## 2019-07-15 RX ADMIN — ONDANSETRON SCH MG: 2 INJECTION INTRAMUSCULAR; INTRAVENOUS at 16:51

## 2019-07-15 RX ADMIN — AZITHROMYCIN DIHYDRATE SCH MLS/HR: 500 INJECTION, POWDER, LYOPHILIZED, FOR SOLUTION INTRAVENOUS at 09:49

## 2019-07-15 RX ADMIN — PIPERACILLIN SODIUM,TAZOBACTAM SODIUM SCH MLS/HR: 3; .375 INJECTION, POWDER, FOR SOLUTION INTRAVENOUS at 17:17

## 2019-07-15 NOTE — PN
Teaching Attending Note


Name of Resident: Tigist Salazar





ATTENDING PHYSICIAN STATEMENT





I saw and evaluated the patient.


I reviewed the resident's note and discussed the case with the resident.


I agree with the resident's findings and plan as documented.








SUBJECTIVE:


No fever or chills. No HA . feels the same, although her breathing is better . 

vomited earlier 





OBJECTIVE:


NAD. supple neck 


HEENT: dry MM, JVD b/l  


CV: RRR, 2/6 SM at Base, and LLSB, with no radiation to carotids. 


Lungs: L mid lung and L base crackles.No R sided crackles 


Ext: no edema or erythema. 


Abd:Soft, Nd,NT. 











ASSESSMENT AND PLAN:





62 y/o lady with h/o heart murmur,anemia, and genital herpes who presented with 

fever, cough, and SOB x 1 week. she was found ot be septic 





1- Sepsis: due to b/l community acquired  PNA. clinically improved 


- legionella neg . 


- d/w ID . zosyn 


- check IV, patient consented 


- follow blood cx 


- cont O2 supp 


-appreciate Pulm help


- She might have underlying chronic pulm disease, given the presence of pulm 

artery  HTN  


- check CT of chest 


- cont IVF 





2- Hyponatremia: due to hypovolemia. cont to monitor 





3- Normocytic anemia: reports being anemic in past, but no specific treatment 

was provided. No GI or  bleed 


- No evidence of iron def on labs. ferritin is an acute phase reactant. will 

need to be repeated as out pt 


- further anemia w/u as out pt  





4- Elevated BNP:not in heart failure clinically. JVD is likely due to the Mod 

TR. 


- echo reviewed..need card and pulm f/u as out pt for MOd MR, TR, and Pulm HTN. 





5- Vomiting , is probably due to Abx. Nl abd exam . 





DVT px. lovenox





stale on non tele floor

## 2019-07-15 NOTE — PN
Physical Exam: 


SUBJECTIVE: Patient seen and examined at the bedside. Pt not in acute distress. 

Pt endorses to having a 100.6 fever at 8pm yesterday that has since resolved w 

tylenol. She endorsed to having b/l diaphragmatic pain and neck pain this AM 

but most likely due to poor sleep habit, and coughing. She has nonproductive 

cough. 








OBJECTIVE:





 Vital Signs











 Period  Temp  Pulse  Resp  BP Sys/Garza  Pulse Ox


 


 Last 24 Hr  98.0 F-99.8 F  85-97  18-20  /61-68  96











GENERAL: The patient is awake, alert, and fully oriented, in no acute distress 

on 3L NC .


HEAD: Normal with no signs of trauma.


EYES: sclera anicteric, conjunctiva clear. No ptosis. 


NECK: supple. 


LUNGS: Left mid lobe and left base slight decreased breath sounds with mild 

crackles, clear to auscultation bilaterally, no wheezes, no crackles, no 


accessory muscle use. 


HEART: Regular rate and rhythm, S1, S2 with 2/6 systolic murmur heard at the 

base that did not radiate to carotids. 


ABDOMEN: Soft, nontender, nondistended, normoactive bowel sounds, no guarding, 

no 


rebound, no masses.


EXTREMITIES: 2+ pulses, warm, well-perfused, no edema. 


PSYCH: Normal mood, normal affect.


SKIN: Warm, dry, no rashes or lesions noted














 Laboratory Results - last 24 hr











  07/14/19 07/15/19 07/15/19





  07:20 07:45 07:45


 


WBC   5.9 


 


RBC   2.94 L 


 


Hgb   9.7 L 


 


Hct   27.5 L 


 


MCV   93.4 


 


MCH   32.9 


 


MCHC   35.2 


 


RDW   12.7 


 


Plt Count   361 


 


MPV   7.0 L 


 


Absolute Neuts (auto)   3.9 


 


Neutrophils %   66.8  D 


 


Lymphocytes %   21.0  D 


 


Monocytes %   8.5  D 


 


Eosinophils %   3.2  D 


 


Basophils %   0.5 


 


Nucleated RBC %   0 


 


Sodium    135 L


 


Potassium    3.7


 


Chloride    102


 


Carbon Dioxide    27


 


Anion Gap    6 L


 


BUN    5.9 L


 


Creatinine    0.4 L


 


Est GFR (CKD-EPI)AfAm    130.29


 


Est GFR (CKD-EPI)NonAf    112.42


 


Random Glucose    96


 


Calcium    8.6


 


Iron  10 L  


 


TIBC  190 L  


 


Iron Saturation  5 L  


 


Unsaturated IBC  180  


 


Total Bilirubin    0.4


 


AST    55 H


 


ALT    58


 


Alkaline Phosphatase    82


 


Total Protein    5.4 L


 


Albumin    2.3 L








Active Medications











Generic Name Dose Route Start Last Admin





  Trade Name Freq  PRN Reason Stop Dose Admin


 


Acetaminophen  1,000 mg  07/14/19 20:55  07/14/19 21:46





  Ofirmev Injection -  IVPB   1,000 mg





  Q6H PRN   Administration





  HEADACHE   





     





     





     


 


Albuterol/Ipratropium  1 amp  07/14/19 12:53  07/15/19 07:30





  Duoneb -  NEB   1 amp





  Q6H PRN   Administration





  SHORTNESS OF BREATH   





     





     





     


 


Benzocaine/Menthol  1 each  07/14/19 07:33  





  Cepacol Lozenge -  MM   





  PRN PRN   





  SORE THROAT   





     





     





     


 


Enoxaparin Sodium  40 mg  07/14/19 10:00  07/15/19 09:43





  Lovenox -  SQ   40 mg





  DAILY MAX   Administration





     





     





     





     


 


Guaifenesin/Codeine Phosphate  10 ml  07/14/19 13:31  07/15/19 04:37





  Robitussin Ac -  PO   10 ml





  Q8H PRN   Administration





  COUGH   





     





     





     


 


Azithromycin  500 mg in 250 mls @ 250 mls/hr  07/14/19 10:00  07/15/19 09:49





  Zithromax 500mg Ivpb (Pre-Docked)  IVPB   250 mls/hr





  DAILY MAX   Administration





     





     





     





     


 


Sodium Chloride  1,000 mls @ 75 mls/hr  07/14/19 13:45  07/14/19 14:00





  Normal Saline -  IV   75 mls/hr





  ASDIR MAX   Administration





     





     





     





     


 


Piperacillin Sod/Tazobactam  50 mls @ 100 mls/hr  07/15/19 10:00  07/15/19 09:44





  Sod 3.375 gm/ Dextrose  IVPB   100 mls/hr





  Q8H-IV MAX   Administration





     





     





  Protocol   





     


 


Ondansetron HCl  4 mg  07/15/19 10:00  07/15/19 10:11





  Zofran Injection  IVPUSH   4 mg





  Q6H MAX   Administration





     





     





     





     











ASSESSMENT/PLAN:





Images:


EKG- sinus tachy, w occasional PVC's, normal QTc, normal IL. 


Echo- mild aortic slcerosis, mild AR, trivial pericardial effusion, EF 65-70%, 

RV ftn nl, mild thickening MV, moderate MR, mild-mod TR, RV systolic pressure 

42. 








This is a 62 y/o woman with PMH of heart murmur,anemia, ? hepatitis, and 

genital herpes who presented with fever, cough, and SOB x 1 week. She was found 

to be septic. 





#Community acquired PNA b/l/ sepsis


- on zosyn per ID, azithro


- fluids continued 


-ordered IgM mycoplasma


- HIV ordered


- pt endorses traveling to [place] and [place] in the past. 


- pertussis serology awaits. 


- UA legionella, strep PNA negative


- ordered procalcitonin


- await results of CT chest 


Pulm - Dr. Schultz: keep SaO2 >90%, continue inhaled BD's, monitor lytes, continue 

IVF, F/U Cx's


ID: Dr [email or web address]: PNA not matching clinical picture, do UA tox, zosyn 3.375, recs 

CT chest, abd, pelvis to find a cause for this picture. Pt endorsed having hx 

of unkown type of hepatitis. 





#Group III Pulm HTN


- Echo- findings of TR suggests this is the cause of the JVD and pulm HTN (RV 

systolic pressure >25) 


- PNA is cause of HTN with some valvular involvement as well via the TR. 


- o/p PFT's, cardio followup. 





#Normocytic anemia


- continue to monitor as outpatient. No management at this time. 





FEN:


- on fluids 


- monitor all lytes


- on regular diet





Prophlyaxis: DVT lovenox 40g SQ. 


Dispo: stable on non-tele. 





Visit type





- Emergency Visit


Emergency Visit: No





- New Patient


This patient is new to me today: Yes


Date on this admission: 07/15/19





- Critical Care


Critical Care patient: No





- Discharge Referral


Referred to [facility] No





ATTENDING PHYSICIAN STATEMENT





I saw and evaluated the patient.


I reviewed the resident's note and discussed the case with the resident.


I agree with the resident's findings and plan as documented.








SUBJECTIVE:








OBJECTIVE:








ASSESSMENT AND PLAN:

## 2019-07-15 NOTE — PN
Progress Note (short form)





- Note


Progress Note: 





PULMONARY





Still with generalized weakness, nonproductive cough and shortness of breath. 

Fever curve trending down.





 Vital Signs











 Period  Temp  Pulse  Resp  BP Sys/Garza  Pulse Ox


 


 Last 24 Hr  98.0 F-100.7 F    18-20  /61-75  96








Gen:  tachypneic with speaking


Herat: RRR


Lung: left base rales, no wheezes


Abd: soft, nontender


Ext: no edema





 CBC, BMP





 07/15/19 07:45 





 07/15/19 07:45 





Active Medications





Acetaminophen (Ofirmev Injection -)  1,000 mg IVPB Q6H PRN


   PRN Reason: HEADACHE


   Last Admin: 07/14/19 21:46 Dose:  1,000 mg


Albuterol/Ipratropium (Duoneb -)  1 amp NEB Q6H PRN


   PRN Reason: SHORTNESS OF BREATH


   Last Admin: 07/15/19 07:30 Dose:  1 amp


Benzocaine/Menthol (Cepacol Lozenge -)  1 each MM PRN PRN


   PRN Reason: SORE THROAT


Enoxaparin Sodium (Lovenox -)  40 mg SQ DAILY MAX


   Last Admin: 07/15/19 09:43 Dose:  40 mg


Guaifenesin/Codeine Phosphate (Robitussin Ac -)  10 ml PO Q8H PRN


   PRN Reason: COUGH


   Last Admin: 07/15/19 04:37 Dose:  10 ml


Azithromycin (Zithromax 500mg Ivpb (Pre-Docked))  500 mg in 250 mls @ 250 mls/

hr IVPB DAILY MAX


   Last Admin: 07/15/19 09:49 Dose:  250 mls/hr


Sodium Chloride (Normal Saline -)  1,000 mls @ 75 mls/hr IV ASDIR MAX


   Last Admin: 07/14/19 14:00 Dose:  75 mls/hr


Piperacillin Sod/Tazobactam (Sod 3.375 gm/ Dextrose)  50 mls @ 100 mls/hr IVPB 

Q8H-IV MAX; Protocol


   Last Admin: 07/15/19 09:44 Dose:  100 mls/hr


Ondansetron HCl (Zofran Injection)  4 mg IVPUSH Q6H MAX


   Last Admin: 07/15/19 10:11 Dose:  4 mg





A/P


Multilobar Pneumonia


Sepsis


Hyponatremia improving


Anemia





-  continue antibiotics per ID


-  f/u cultures


-  IVF


-  monitor lytes


-  inhaled bronchodilators


-  O2 to keep Spo2 >90%


-  DVT prophylaxis

## 2019-07-15 NOTE — ECHO
______________________________________________________________________________



Name: CASIE ZHANG MARILYN                                Exam:Adult Echocardiogram

MRN: [id number]         Study Date: 07/15/2019 08:02 AM

Age: 63 yrs

______________________________________________________________________________



Reason For Study: CHF

Height: 66 in        Weight: 134 lb        BSA: 1.7 m2



______________________________________________________________________________



MMode/2D Measurements & Calculations

IVSd: 1.1 cm                                            Ao root diam: 2.6 cm

LVIDd: 4.1 cm                                           LA dimension: 3.6 cm

LVIDs: 2.6 cm

LVPWd: 0.99 cm



_________________________________________________________

EDV(Teich): 75.9 ml                                     LVOT diam: 2.0 cm

ESV(Teich): 24.5 ml



_________________________________________________________

LAV (MOD-bp): 57.7 ml



Doppler Measurements & Calculations

MV E max andi: 147.0 cm/sec                                 Ao V2 max: 250.6 cm/sec

MV A max andi: 151.2 cm/sec                                 Ao max P.1 mmHg

MV E/A: 0.97                                               Ao V2 mean: 175.0 cm/sec

MV dec time: 0.14 sec                                      Ao mean P.0 mmHg

                                                           Ao V2 VTI: 48.5 cm



                                                           DALJIT(I,D): 1.8 cm2

                                                           AI P1/2t: 229.2 msec

                                                           DALJIT(V,D): 1.6 cm2

____________________________________________________________



AI max andi: 486.2 cm/sec                                   LV V1 max P.9 mmHg

AI max P.5 mmHg                                       LV V1 mean PG: 3.5 mmHg

AI dec slope: 621.1 cm/sec2                                LV V1 max: 131.0 cm/sec

                                                           LV V1 mean: 86.4 cm/sec

                                                           LV V1 VTI: 27.4 cm

____________________________________________________________



MR max andi: 586.9 cm/sec                                   SV(LVOT): 85.1 ml

MR max P.8 mmHg

____________________________________________________________



TR max andi: 296.7 cm/sec                                   PA V2 max: 129.0 cm/sec

TR max P.4 mmHg                                       PA max P.7 mmHg

____________________________________________________________



Med Peak E' Andi: 9.4 cm/sec                                PI Vmax: 257.9 cm/sec

Med E/e': 15.7

Lat Peak E' Andi: 11.1 cm/sec

Lat E/e': 13.2



______________________________________________________________________________



Procedure

A complete two-dimensional transthoracic echocardiogram was performed (2D, M-mode, Doppler and color 
flow

Doppler). The study was technically good with many images being of high quality.

Left Ventricle

The left ventricle is normal in size. Left ventricular systolic function is normal. Ejection Fraction
 = 65-

70%. No regional wall motion abnormalities noted.

Right Ventricle

The right ventricle is normal size. The right ventricular systolic function is normal.

Atria

The left atrial size is normal. LA volume index is 34 ml/m2. Right atrial size is normal.

Mitral Valve

There is mild mitral valve thickening. There is moderate mitral regurgitation.

Tricuspid Valve

The tricuspid valve is normal in structure and function. There is mild to moderate tricuspid regurgit
ation.

Pulmonary artery systolic pressure is at least 42 mmHg as RA pressure is 3 mmHg.

Aortic Valve

There is mild aortic sclerosis.;. Mild aortic regurgitation.

Pulmonic Valve

The pulmonic valve is not well visualized.

Great Vessels

The aortic root is normal size.

Pericardium/Pleura

Trivial pericardial effusion not hemodynamically significant.

______________________________________________________________________________





Interpretation Summary

The left ventricle is normal in size.

Left ventricular systolic function is normal.

No regional wall motion abnormalities noted.

Ejection Fraction = 65-70%.

The right ventricular systolic function is normal.

The left atrial size is normal.

Right atrial size is normal.

There is mild mitral valve thickening.

There is moderate mitral regurgitation.

There is mild to moderate tricuspid regurgitation.

Pulmonary artery systolic pressure is at least 42 mmHg as RA pressure is 3 mmHg

There is mild aortic sclerosis.;

Mild aortic regurgitation.

Trivial pericardial effusion not hemodynamically significant



Previous study is not available for comparison





Kyaw Garza MD 07/15/2019 11:08 AM

## 2019-07-15 NOTE — CON.ID
Consult


Consult Specialty:: infectious diseases


Referred by:: 


Reason for Consultation:: sob,pneumonia





- History of Present Illness


Chief Complaint: sob,cough,weakness


History of Present Illness: 





63 year old female with past medical history of heart murmur and vaginal herpes,

admitted due to 1 week history of cough and worsening SOB. Patient reported she 

experienced nonproductive cough, body malaise, subjective fevers and headache 

that started 1 week ago. No medications taken,  Four days ago, she started 

experiencing shortness of breath, worsened with exertion, and had poor oral 

intake. She also started to sleep with head elevated as lying flat makes her 

cough and dyspneic. Two days ago, she followed up with her PCP, and she was 

told that she probably had viral fever and was not prescribed anything


patient then started feeling bad and was asked to come to the hospital and be 

admitted which she diid 


Of note, patient also reports decreased urine output for the past 2 days. She 

reports bloating and incomplete bladder emptying sensation, but denies dysuria 

or hematuria. She took Lasix 20mg from her , and reported good urine 

output afterwards, but today, urine output is decreased again.


patient has a foleys catheter in place now to measure her urine output 


denies using drugs,has smoked 





- History Source


History Provided By: Patient


Limitations to Obtaining History: No Limitations





- Past Medical History


CNS: No: Alzheimer's


Cardio/Vascular: No: AFIB, HTN, Hyperlipdemia


Pulmonary: No: Cancer, COPD, O2 Dependent, Pulmonary Fibrosis


Gastrointestinal: No: Cancer


Hepatobiliary: No: Cirrhosis


Renal/: No: Renal Failure


...Pregnant: No


Infectious Disease: Yes: Other (GENITAL HERPES )


Psych: No: Addictions


Musculoskeletal: No: Bursitis


Rheumatology: No: Fibromyalgia


Endocrine: No: Hypothyroidism





- Past Surgical History


Past Surgical History: Yes: Appendectomy





- Alcohol/Substance Use


Hx Alcohol Use: No


History of Substance Use: reports: None





- Smoking History


Smoking history: Former smoker


Have you smoked in the past 12 months: No


If you are a former smoker, when did you quit?: MANY YEARS AGO





- Social History


ADL: Independent


History of Recent Travel: No





Home Medications





- Allergies


Allergies/Adverse Reactions: 


 Allergies











Allergy/AdvReac Type Severity Reaction Status Date / Time


 


No Known Allergies Allergy   Verified 07/13/19 09:59














Family Disease History





- Family Disease History


Family Disease History: CA: Grandparent, Father (COLON CANCER)





Physical Exam


Vital Signs: 


 Vital Signs











Temperature  99.8 F H  07/15/19 06:57


 


Pulse Rate  85   07/15/19 06:57


 


Respiratory Rate  20   07/15/19 06:57


 


Blood Pressure  118/68   07/15/19 06:57


 


O2 Sat by Pulse Oximetry (%)  96   07/14/19 21:00











Constitutional: Yes: Calm, Mild Distress


Cardiovascular: Yes: Regular Rate and Rhythm


Respiratory: Yes: Regular, Poor Air Entry, Other


Gastrointestinal: Yes: Normal Bowel Sounds, Soft


Musculoskeletal: Yes: WNL


Extremities: Yes: Other


Integumentary: Yes: WNL


Neurological: Yes: Alert, Oriented


Psychiatric: Yes: Alert, Oriented





Imaging





- Results


Chest X-ray: Report Reviewed, Image Reviewed





Assessment/Plan





this patient with multiple medical problems coming to the hospital with sob and 

cough which is dry


patient does have pneumonia but all her symptoms do not match her pneumonia


also patient has suddenly low urine output


i would extensively work her up with a ct scan and also she mentions she had 

hepatitis does not know which type


also patient mention she never uses drugs but i would work her up from that 

point of view


also please work her up for hiv





i am going to change the abx to zosyn 


ct scan of the chest abd and pelvis should be done


rest as per the team

## 2019-07-16 LAB
ALBUMIN SERPL-MCNC: 2.3 G/DL (ref 3.4–5)
ALP SERPL-CCNC: 77 U/L (ref 45–117)
ALT SERPL-CCNC: 62 U/L (ref 13–61)
ANION GAP SERPL CALC-SCNC: 5 MMOL/L (ref 8–16)
ANISOCYTOSIS BLD QL: 0
AST SERPL-CCNC: 58 U/L (ref 15–37)
B PERT IGM SER IA-ACNC: <1 INDEX (ref 0–0.9)
B PERT.PT IGG SER-ACNC: 3.88 INDEX (ref 0–0.94)
BASOPHILS # BLD: 1 % (ref 0–2)
BILIRUB SERPL-MCNC: 0.3 MG/DL (ref 0.2–1)
BUN SERPL-MCNC: 6.2 MG/DL (ref 7–18)
CALCIUM SERPL-MCNC: 8.8 MG/DL (ref 8.5–10.1)
CHLORIDE SERPL-SCNC: 102 MMOL/L (ref 98–107)
CO2 SERPL-SCNC: 30 MMOL/L (ref 21–32)
CREAT SERPL-MCNC: 0.4 MG/DL (ref 0.55–1.3)
DEPRECATED RDW RBC AUTO: 12.4 % (ref 11.6–15.6)
EOSINOPHIL # BLD: 5.8 % (ref 0–4.5)
GLUCOSE SERPL-MCNC: 89 MG/DL (ref 74–106)
HCT VFR BLD CALC: 27.9 % (ref 32.4–45.2)
HGB BLD-MCNC: 9.6 GM/DL (ref 10.7–15.3)
LYMPHOCYTES # BLD: 36.2 % (ref 8–40)
MACROCYTES BLD QL: 0
MCH RBC QN AUTO: 32.4 PG (ref 25.7–33.7)
MCHC RBC AUTO-ENTMCNC: 34.4 G/DL (ref 32–36)
MCV RBC: 94.2 FL (ref 80–96)
MONOCYTES # BLD AUTO: 12.7 % (ref 3.8–10.2)
NEUTROPHILS # BLD: 44.3 % (ref 42.8–82.8)
PLATELET # BLD AUTO: 380 K/MM3 (ref 134–434)
PLATELET BLD QL SMEAR: NORMAL
PMV BLD: 7 FL (ref 7.5–11.1)
POTASSIUM SERPLBLD-SCNC: 4.1 MMOL/L (ref 3.5–5.1)
PROT SERPL-MCNC: 5.5 G/DL (ref 6.4–8.2)
RBC # BLD AUTO: 2.96 M/MM3 (ref 3.6–5.2)
SODIUM SERPL-SCNC: 136 MMOL/L (ref 136–145)
WBC # BLD AUTO: 3.1 K/MM3 (ref 4–10)

## 2019-07-16 RX ADMIN — SODIUM CHLORIDE SCH MLS/HR: 9 INJECTION, SOLUTION INTRAVENOUS at 09:49

## 2019-07-16 RX ADMIN — IPRATROPIUM BROMIDE AND ALBUTEROL SULFATE PRN AMP: .5; 3 SOLUTION RESPIRATORY (INHALATION) at 20:35

## 2019-07-16 RX ADMIN — ONDANSETRON SCH: 2 INJECTION INTRAMUSCULAR; INTRAVENOUS at 12:24

## 2019-07-16 RX ADMIN — PIPERACILLIN SODIUM,TAZOBACTAM SODIUM SCH MLS/HR: 3; .375 INJECTION, POWDER, FOR SOLUTION INTRAVENOUS at 09:49

## 2019-07-16 RX ADMIN — ENOXAPARIN SODIUM SCH MG: 40 INJECTION SUBCUTANEOUS at 09:48

## 2019-07-16 RX ADMIN — GUAIFENESIN AND CODEINE PHOSPHATE PRN ML: 10; 100 LIQUID ORAL at 22:04

## 2019-07-16 RX ADMIN — AZITHROMYCIN DIHYDRATE SCH MLS/HR: 500 INJECTION, POWDER, LYOPHILIZED, FOR SOLUTION INTRAVENOUS at 09:48

## 2019-07-16 RX ADMIN — ONDANSETRON SCH MG: 2 INJECTION INTRAMUSCULAR; INTRAVENOUS at 04:41

## 2019-07-16 RX ADMIN — PIPERACILLIN SODIUM,TAZOBACTAM SODIUM SCH MLS/HR: 3; .375 INJECTION, POWDER, FOR SOLUTION INTRAVENOUS at 17:26

## 2019-07-16 RX ADMIN — GUAIFENESIN AND CODEINE PHOSPHATE PRN ML: 10; 100 LIQUID ORAL at 09:50

## 2019-07-16 RX ADMIN — PIPERACILLIN SODIUM,TAZOBACTAM SODIUM SCH MLS/HR: 3; .375 INJECTION, POWDER, FOR SOLUTION INTRAVENOUS at 01:57

## 2019-07-16 RX ADMIN — ENOXAPARIN SODIUM SCH: 40 INJECTION SUBCUTANEOUS at 09:51

## 2019-07-16 NOTE — PN
Progress Note, Physician


History of Present Illness: 





stable


no new issues


for imaging studies today





- Current Medication List


Current Medications: 


Active Medications





Acetaminophen (Ofirmev Injection -)  1,000 mg IVPB Q6H PRN


   PRN Reason: HEADACHE


   Last Admin: 07/14/19 21:46 Dose:  1,000 mg


Albuterol/Ipratropium (Duoneb -)  1 amp NEB Q6H PRN


   PRN Reason: SHORTNESS OF BREATH


   Last Admin: 07/15/19 07:30 Dose:  1 amp


Benzocaine/Menthol (Cepacol Lozenge -)  1 each MM PRN PRN


   PRN Reason: SORE THROAT


Enoxaparin Sodium (Lovenox -)  40 mg SQ DAILY MAX


   Last Admin: 07/16/19 09:51 Dose:  Not Given


Guaifenesin/Codeine Phosphate (Robitussin Ac -)  10 ml PO Q8H PRN


   PRN Reason: COUGH


   Last Admin: 07/16/19 09:50 Dose:  10 ml


Azithromycin (Zithromax 500mg Ivpb (Pre-Docked))  500 mg in 250 mls @ 250 mls/

hr IVPB DAILY MAX


   Last Admin: 07/16/19 09:48 Dose:  250 mls/hr


Sodium Chloride (Normal Saline -)  1,000 mls @ 75 mls/hr IV ASDIR MAX


   Last Admin: 07/16/19 09:49 Dose:  75 mls/hr


Piperacillin Sod/Tazobactam (Sod 3.375 gm/ Dextrose)  50 mls @ 100 mls/hr IVPB 

Q8H-IV MAX; Protocol


   Last Admin: 07/16/19 09:49 Dose:  100 mls/hr


Ondansetron HCl (Zofran Injection)  4 mg IVPUSH Q6H MAX


   Last Admin: 07/16/19 12:24 Dose:  Not Given











- Objective


Vital Signs: 


 Vital Signs











Temperature  97.8 F   07/16/19 06:22


 


Pulse Rate  75   07/16/19 06:22


 


Respiratory Rate  20   07/16/19 06:22


 


Blood Pressure  119/67   07/16/19 06:22


 


O2 Sat by Pulse Oximetry (%)  95   07/15/19 21:00











Constitutional: Yes: No Distress, Calm, Thin


Cardiovascular: Yes: Regular Rate and Rhythm


Gastrointestinal: Yes: Normal Bowel Sounds, Soft


Musculoskeletal: Yes: WNL


Extremities: Yes: WNL


Neurological: Yes: Alert, Oriented


Psychiatric: Yes: Alert, Oriented


Labs: 


 CBC, BMP





 07/16/19 06:20 





 07/16/19 06:20 





 INR, PTT











INR  1.19  (0.83-1.09)  H  07/13/19  11:00    














Assessment/Plan





plan


continue abx


await for imaging studies


await for other  reports


rest as per the team

## 2019-07-16 NOTE — PN
Physical Exam: 


SUBJECTIVE: Patient seen and examined at bedside. pt cough subsided, satting 

well of o2 trying to titrate her off o2. Pt remained afebrile overnight. 








OBJECTIVE:





 Vital Signs











 Period  Temp  Pulse  Resp  BP Sys/Garza  Pulse Ox


 


 Last 24 Hr  97.8 F-98.5 F  75-94  20-20  104-140/66-76  94-95











GENERAL: The patient is awake, alert, and fully oriented, in no acute distress.


HEAD: Normal with no signs of trauma.


EYES: conjunctiva clear. No ptosis. 


NECK: supple. 


LUNGS: Breath sounds normal, clear to auscultation bilaterally, no wheezes, no 

crackles, no 


accessory muscle use. 


HEART: Regular rate and rhythm, S1, S2 without murmur, rub or gallop.


ABDOMEN: Soft, nontender, nondistended, normoactive bowel sounds, no guarding, 

no 


rebound.


EXTREMITIES: 2+ pulses, warm, well-perfused, no edema. 


NEUROLOGICAL: Cranial nerves II through XII grossly intact. Normal speech, gait 

not 


observed.


PSYCH: Normal mood, normal affect.


SKIN: Warm, dry, no rashes or lesions noted














 Laboratory Results - last 24 hr











  07/16/19 07/16/19





  06:20 06:20


 


WBC  3.1 L 


 


RBC  2.96 L 


 


Hgb  9.6 L 


 


Hct  27.9 L 


 


MCV  94.2 


 


MCH  32.4 


 


MCHC  34.4 


 


RDW  12.4 


 


Plt Count  380 


 


MPV  7.0 L 


 


Absolute Neuts (auto)  1.4 L 


 


Neutrophils %  44.3  D 


 


Neutrophils % (Manual)  46.4 


 


Band Neutrophils %  0.0 


 


Lymphocytes %  36.2  D 


 


Lymphocytes % (Manual)  35.1  D 


 


Monocytes %  12.7 H 


 


Monocytes % (Manual)  8  D 


 


Eosinophils %  5.8 H D 


 


Eosinophils % (Manual)  4.1  D 


 


Basophils %  1.0 


 


Basophils % (Manual)  1.0  D 


 


Myelocytes % (Man)  1  D 


 


Promyelocytes % (Man)  0 


 


Blast Cells % (Manual)  1 H D 


 


Nucleated RBC %  0 


 


Metamyelocytes  2  D 


 


Hypochromia  0 


 


Platelet Estimate  Normal 


 


Polychromasia  0 


 


Poikilocytosis  0 


 


Anisocytosis  0 


 


Microcytosis  0 


 


Macrocytosis  0 


 


Sodium   136


 


Potassium   4.1


 


Chloride   102


 


Carbon Dioxide   30


 


Anion Gap   5 L


 


BUN   6.2 L


 


Creatinine   0.4 L


 


Est GFR (CKD-EPI)AfAm   130.29


 


Est GFR (CKD-EPI)NonAf   112.42


 


Random Glucose   89


 


Calcium   8.8


 


Total Bilirubin   0.3


 


AST   58 H


 


ALT   62 H


 


Alkaline Phosphatase   77


 


Total Protein   5.5 L


 


Albumin   2.3 L








Active Medications











Generic Name Dose Route Start Last Admin





  Trade Name Freq  PRN Reason Stop Dose Admin


 


Acetaminophen  1,000 mg  07/14/19 20:55  07/14/19 21:46





  Ofirmev Injection -  IVPB   1,000 mg





  Q6H PRN   Administration





  HEADACHE   





     





     





     


 


Albuterol/Ipratropium  1 amp  07/14/19 12:53  07/15/19 07:30





  Duoneb -  NEB   1 amp





  Q6H PRN   Administration





  SHORTNESS OF BREATH   





     





     





     


 


Benzocaine/Menthol  1 each  07/14/19 07:33  





  Cepacol Lozenge -  MM   





  PRN PRN   





  SORE THROAT   





     





     





     


 


Enoxaparin Sodium  40 mg  07/14/19 10:00  07/16/19 09:51





  Lovenox -  SQ   Not Given





  DAILY MAX   





     





     





     





     


 


Guaifenesin/Codeine Phosphate  10 ml  07/14/19 13:31  07/16/19 09:50





  Robitussin Ac -  PO   10 ml





  Q8H PRN   Administration





  COUGH   





     





     





     


 


Azithromycin  500 mg in 250 mls @ 250 mls/hr  07/14/19 10:00  07/16/19 09:48





  Zithromax 500mg Ivpb (Pre-Docked)  IVPB   250 mls/hr





  DAILY MAX   Administration





     





     





     





     


 


Sodium Chloride  1,000 mls @ 75 mls/hr  07/14/19 13:45  07/16/19 09:49





  Normal Saline -  IV   75 mls/hr





  ASDIR MAX   Administration





     





     





     





     


 


Piperacillin Sod/Tazobactam  50 mls @ 100 mls/hr  07/15/19 10:00  07/16/19 17:26





  Sod 3.375 gm/ Dextrose  IVPB   100 mls/hr





  Q8H-IV MAX   Administration





     





     





  Protocol   





     


 


Ondansetron HCl  4 mg  07/16/19 15:45  





  Zofran Injection  IVPUSH   





  Q6H PRN   





  NAUSEA   





     





     





     











ASSESSMENT/PLAN:





Images:


EKG- sinus tachy, w occasional PVC's, normal QTc, normal OH. 


Echo- mild aortic slcerosis, mild AR, trivial pericardial effusion, EF 65-70%, 

RV ftn nl, mild thickening MV, moderate MR, mild-mod TR, RV systolic pressure 

42. 








This is a 62 y/o woman with PMH of heart murmur,anemia, ? hepatitis, and 

genital herpes who presented with fever, cough, and SOB x 1 week. She was found 

to be septic. 





#Community acquired PNA b/l/ sepsis


- on zosyn per ID, azithro


- fluids continued 


-ordered IgM mycoplasma


- HIV ordered


- pt endorses traveling to [place] and [place] in the past. 


- pertussis serology awaits. 


- UA legionella, strep PNA negative


- ordered procalcitonin


- pt maintaining 94-96 saturation on RA but O2 as needed per NC. 


- CT results showed b/l PNA mostly in LLL, pleural effusions lt >rt. 


- repeat CT chest needed in 4-8 wks due to possible mass in lung. 


- follow up cbc bc recent neutropenia. 


Pulm - Dr. Schultz: keep SaO2 >90%, continue inhaled BD's, monitor lytes, continue 

IVF, F/U Cx's


ID: Dr [email or web address]: PNA not matching clinical picture, do UA tox, zosyn 3.375, recs 

CT chest, abd, pelvis to find a cause for this picture. Pt endorsed having hx 

of unkown type of hepatitis. 





#Group III Pulm HTN


- Echo- findings of TR suggests this is the cause of the JVD and pulm HTN (RV 

systolic pressure >25) 


- PNA is cause of HTN with some valvular involvement as well via the TR. 


- o/p PFT's, cardio followup. 





#Normocytic anemia


- continue to monitor as outpatient. No management at this time. 





FEN:


- on fluids velásquez d/c'd


- monitor all lytes


- on regular diet





Prophlyaxis: DVT lovenox 40g SQ. 


Dispo: stable on non-tele. 








Visit type





- Emergency Visit


Emergency Visit: No





- New Patient


This patient is new to me today: No





- Critical Care


Critical Care patient: No





- Discharge Referral


Referred to [facility] No





ATTENDING PHYSICIAN STATEMENT





I saw and evaluated the patient.


I reviewed the resident's note and discussed the case with the resident.


I agree with the resident's findings and plan as documented.








SUBJECTIVE:








OBJECTIVE:








ASSESSMENT AND PLAN:

## 2019-07-16 NOTE — PN
Progress Note (short form)





- Note


Progress Note: 





PULMONARY





Feels better today. Less short of breath. +nonproductive cough. No fevers.





 Vital Signs











 Period  Temp  Pulse  Resp  BP Sys/Garza  Pulse Ox


 


 Last 24 Hr  97.8 F-98.6 F  75-88  20-20  119-140/67-77  95











Gen:  less tachypneic


Herat: RRR


Lung: left base rales, no wheezes


Abd: soft, nontender


Ext: no edema





 CBC, BMP





 07/16/19 06:20 





 07/16/19 06:20 





Active Medications





Acetaminophen (Ofirmev Injection -)  1,000 mg IVPB Q6H PRN


   PRN Reason: HEADACHE


   Last Admin: 07/14/19 21:46 Dose:  1,000 mg


Albuterol/Ipratropium (Duoneb -)  1 amp NEB Q6H PRN


   PRN Reason: SHORTNESS OF BREATH


   Last Admin: 07/15/19 07:30 Dose:  1 amp


Benzocaine/Menthol (Cepacol Lozenge -)  1 each MM PRN PRN


   PRN Reason: SORE THROAT


Enoxaparin Sodium (Lovenox -)  40 mg SQ DAILY MAX


   Last Admin: 07/16/19 09:51 Dose:  Not Given


Guaifenesin/Codeine Phosphate (Robitussin Ac -)  10 ml PO Q8H PRN


   PRN Reason: COUGH


   Last Admin: 07/16/19 09:50 Dose:  10 ml


Azithromycin (Zithromax 500mg Ivpb (Pre-Docked))  500 mg in 250 mls @ 250 mls/

hr IVPB DAILY MAX


   Last Admin: 07/16/19 09:48 Dose:  250 mls/hr


Sodium Chloride (Normal Saline -)  1,000 mls @ 75 mls/hr IV ASDIR MAX


   Last Admin: 07/16/19 09:49 Dose:  75 mls/hr


Piperacillin Sod/Tazobactam (Sod 3.375 gm/ Dextrose)  50 mls @ 100 mls/hr IVPB 

Q8H-IV MAX; Protocol


   Last Admin: 07/16/19 09:49 Dose:  100 mls/hr


Ondansetron HCl (Zofran Injection)  4 mg IVPUSH Q6H MAX


   Last Admin: 07/16/19 04:41 Dose:  4 mg








A/P


Multilobar Pneumonia


Sepsis


Hyponatremia improving


Anemia





-  continue antibiotics per ID


-  f/u cultures


-  IVF


-  monitor lytes


-  inhaled bronchodilators


-  O2 to keep Spo2 >90%


-  encouraged ambulation


-  DVT prophylaxis

## 2019-07-16 NOTE — PN
Teaching Attending Note


Name of Resident: Alverto Long





ATTENDING PHYSICIAN STATEMENT





I saw and evaluated the patient.


I reviewed the resident's note and discussed the case with the resident.


I agree with the resident's findings and plan as documented.








SUBJECTIVE:


no fever or chills. feels better. No HA , no palpitations. cough is better 





OBJECTIVE:


NAD. supple neck 


HEENT: JVD b/l  


CV: RRR, 2/6 SM at Base, and LLSB, with no radiation to carotids. 


Lungs: b/l crackles half way down 


Ext: no edema or erythema. 


Abd:Soft, Nd,NT. NL BS 











ASSESSMENT AND PLAN:





62 y/o lady with h/o heart murmur,anemia, and genital herpes who presented with 

fever, cough, and SOB x 1 week. she was found ot be septic 





1- Sepsis: due to b/l community acquired  PNA. clinically improved 


- Ct reviewed. 


- cont zosyn 


- HIV pending. Mycoplasma igM pending 


- follow blood cx ( neg to date ) 


- cont O2 supp as needed ( 94-96% when checked on RA ) 


- cont IVF 


- needs repeat CT as out pt in 4-8 weeks 


- monitor CBC , as neutropenic today 





2- Hyponatremia: due to hypovolemia. cont to monitor 





3- Normocytic anemia:  


- No evidence of iron def on labs. ferritin is an acute phase reactant. will 

need to be repeated as out pt 


- further anemia w/u as out pt  





4- Elevated BNP:not in heart failure clinically. JVD is likely due to the Mod 

TR. 


- Need card and pulm f/u as out pt for MOd MR, TR, and Pulm HTN. patient 

notified 








DVT px. lovenox





dispo : [facility]

## 2019-07-17 LAB
ALBUMIN SERPL-MCNC: 2.4 G/DL (ref 3.4–5)
ALP SERPL-CCNC: 80 U/L (ref 45–117)
ALT SERPL-CCNC: 85 U/L (ref 13–61)
ANION GAP SERPL CALC-SCNC: 4 MMOL/L (ref 8–16)
AST SERPL-CCNC: 70 U/L (ref 15–37)
BASOPHILS # BLD: 0.8 % (ref 0–2)
BILIRUB SERPL-MCNC: 0.3 MG/DL (ref 0.2–1)
BUN SERPL-MCNC: 8.1 MG/DL (ref 7–18)
CALCIUM SERPL-MCNC: 9 MG/DL (ref 8.5–10.1)
CHLORIDE SERPL-SCNC: 102 MMOL/L (ref 98–107)
CO2 SERPL-SCNC: 31 MMOL/L (ref 21–32)
CREAT SERPL-MCNC: 0.5 MG/DL (ref 0.55–1.3)
DEPRECATED RDW RBC AUTO: 12.6 % (ref 11.6–15.6)
EOSINOPHIL # BLD: 7 % (ref 0–4.5)
GLUCOSE SERPL-MCNC: 86 MG/DL (ref 74–106)
HCT VFR BLD CALC: 30.5 % (ref 32.4–45.2)
HGB BLD-MCNC: 10.5 GM/DL (ref 10.7–15.3)
LYMPHOCYTES # BLD: 34.6 % (ref 8–40)
MCH RBC QN AUTO: 32.6 PG (ref 25.7–33.7)
MCHC RBC AUTO-ENTMCNC: 34.3 G/DL (ref 32–36)
MCV RBC: 95.1 FL (ref 80–96)
MONOCYTES # BLD AUTO: 9.7 % (ref 3.8–10.2)
NEUTROPHILS # BLD: 47.9 % (ref 42.8–82.8)
PLATELET # BLD AUTO: 457 K/MM3 (ref 134–434)
PMV BLD: 7.1 FL (ref 7.5–11.1)
POTASSIUM SERPLBLD-SCNC: 4.7 MMOL/L (ref 3.5–5.1)
PROT SERPL-MCNC: 5.8 G/DL (ref 6.4–8.2)
RBC # BLD AUTO: 3.2 M/MM3 (ref 3.6–5.2)
SODIUM SERPL-SCNC: 136 MMOL/L (ref 136–145)
WBC # BLD AUTO: 3.3 K/MM3 (ref 4–10)

## 2019-07-17 RX ADMIN — SODIUM CHLORIDE SCH MLS/HR: 9 INJECTION, SOLUTION INTRAVENOUS at 15:36

## 2019-07-17 RX ADMIN — SODIUM CHLORIDE SCH: 9 INJECTION, SOLUTION INTRAVENOUS at 13:45

## 2019-07-17 RX ADMIN — AZITHROMYCIN DIHYDRATE SCH MLS/HR: 500 INJECTION, POWDER, LYOPHILIZED, FOR SOLUTION INTRAVENOUS at 09:49

## 2019-07-17 RX ADMIN — ENOXAPARIN SODIUM SCH MG: 40 INJECTION SUBCUTANEOUS at 09:48

## 2019-07-17 RX ADMIN — PIPERACILLIN SODIUM,TAZOBACTAM SODIUM SCH MLS/HR: 3; .375 INJECTION, POWDER, FOR SOLUTION INTRAVENOUS at 09:49

## 2019-07-17 RX ADMIN — PIPERACILLIN SODIUM,TAZOBACTAM SODIUM SCH MLS/HR: 3; .375 INJECTION, POWDER, FOR SOLUTION INTRAVENOUS at 02:32

## 2019-07-17 RX ADMIN — ENOXAPARIN SODIUM SCH: 40 INJECTION SUBCUTANEOUS at 09:53

## 2019-07-17 RX ADMIN — PIPERACILLIN SODIUM,TAZOBACTAM SODIUM SCH MLS/HR: 3; .375 INJECTION, POWDER, FOR SOLUTION INTRAVENOUS at 17:16

## 2019-07-17 NOTE — PN
Progress Note, Physician


History of Present Illness: 





stable


feels much better





- Current Medication List


Current Medications: 


Active Medications





Acetaminophen (Ofirmev Injection -)  1,000 mg IVPB Q6H PRN


   PRN Reason: HEADACHE


   Last Admin: 07/14/19 21:46 Dose:  1,000 mg


Albuterol/Ipratropium (Duoneb -)  1 amp NEB Q6H PRN


   PRN Reason: SHORTNESS OF BREATH


   Last Admin: 07/16/19 20:35 Dose:  1 amp


Benzocaine/Menthol (Cepacol Lozenge -)  1 each MM PRN PRN


   PRN Reason: SORE THROAT


Enoxaparin Sodium (Lovenox -)  40 mg SQ DAILY MAX


   Last Admin: 07/17/19 09:53 Dose:  Not Given


Guaifenesin/Codeine Phosphate (Robitussin Ac -)  10 ml PO Q8H PRN


   PRN Reason: COUGH


   Last Admin: 07/16/19 22:04 Dose:  10 ml


Azithromycin (Zithromax 500mg Ivpb (Pre-Docked))  500 mg in 250 mls @ 250 mls/

hr IVPB DAILY MAX


   Last Admin: 07/17/19 09:49 Dose:  250 mls/hr


Sodium Chloride (Normal Saline -)  1,000 mls @ 75 mls/hr IV ASDIR MAX


   Last Admin: 07/16/19 09:49 Dose:  75 mls/hr


Piperacillin Sod/Tazobactam (Sod 3.375 gm/ Dextrose)  50 mls @ 100 mls/hr IVPB 

Q8H-IV MAX; Protocol


   Last Admin: 07/17/19 09:49 Dose:  100 mls/hr


Ondansetron HCl (Zofran Injection)  4 mg IVPUSH Q6H PRN


   PRN Reason: NAUSEA











- Objective


Vital Signs: 


 Vital Signs











Temperature  98.6 F   07/17/19 10:00


 


Pulse Rate  83   07/17/19 10:00


 


Respiratory Rate  18   07/17/19 10:00


 


Blood Pressure  113/69   07/17/19 10:00


 


O2 Sat by Pulse Oximetry (%)  96   07/17/19 08:57











Constitutional: Yes: No Distress, Calm, Thin


Cardiovascular: Yes: S1, S2


Respiratory: Yes: Regular, CTA Bilaterally


Gastrointestinal: Yes: Normal Bowel Sounds, Soft


Musculoskeletal: Yes: WNL


Extremities: Yes: WNL


Neurological: Yes: Alert, Oriented


Psychiatric: Yes: Alert, Oriented


Labs: 


 CBC, BMP





 07/17/19 08:05 





 07/17/19 08:05 





 INR, PTT











INR  1.19  (0.83-1.09)  H  07/13/19  11:00    














Assessment/Plan





pneumonia


fever


weakness





plan


continue current abx


ct scan seen and result noted


incentive stone


rest as per the team

## 2019-07-17 NOTE — PN
Physical Exam: 


SUBJECTIVE: Patient seen and examined at bedside not on any o2. Only acute 

event was slight coughing that subsided with robitussin. Otherwise vitals 

stable afebrile throughout night. 








OBJECTIVE:





 Vital Signs











 Period  Temp  Pulse  Resp  BP Sys/Garza  Pulse Ox


 


 Last 24 Hr  98.1 F-98.6 F  67-94  18-20  113-128/64-78  96-98











GENERAL: The patient is awake, alert, and fully oriented, in no acute distress.


HEAD: Normal with no signs of trauma.


EYES:  sclera anicteric, conjunctiva clear. No ptosis. 


NECK:supple. 


LUNGS: Breath sounds slightly decreased on right side clear to auscultation 

bilaterally, no wheezes, no crackles, no 


accessory muscle use. 


HEART: Regular rate and rhythm, S1, S2 without murmur, rub or gallop.


ABDOMEN: Soft, nontender, nondistended, normoactive bowel sounds, no guarding, 

no 


rebound, no masses.


EXTREMITIES: 2+ pulses, warm, well-perfused, no edema. 


NEUROLOGICAL: Cranial nerves II through XII grossly intact. Normal speech, gait 

not 


observed.


PSYCH: Normal mood, normal affect.


SKIN: Warm, dry, no rashes or lesions noted














 Laboratory Results - last 24 hr











  07/14/19 07/16/19 07/17/19





  07:20 06:20 08:05


 


WBC    3.3 L


 


RBC    3.20 L


 


Hgb    10.5 L


 


Hct    30.5 L


 


MCV    95.1


 


MCH    32.6


 


MCHC    34.3


 


RDW    12.6


 


Plt Count    457 H D


 


MPV    7.1 L


 


Absolute Neuts (auto)    1.6


 


Neutrophils %    47.9


 


Lymphocytes %    34.6


 


Monocytes %    9.7


 


Eosinophils %    7.0 H


 


Basophils %    0.8


 


Nucleated RBC %    0


 


Sodium   


 


Potassium   


 


Chloride   


 


Carbon Dioxide   


 


Anion Gap   


 


BUN   


 


Creatinine   


 


Est GFR (CKD-EPI)AfAm   


 


Est GFR (CKD-EPI)NonAf   


 


Random Glucose   


 


Calcium   


 


Total Bilirubin   


 


AST   


 


ALT   


 


Alkaline Phosphatase   


 


Total Protein   


 


Albumin   


 


B. pertussis IgG Ab  3.88 H  


 


B. pertussis IgA Ab  <1.0  


 


B. pertussis IgM Ab  <1.0  


 


HIV 1&2 Ag/Ab, 4th Gen   Non reactive 














  07/17/19





  08:05


 


WBC 


 


RBC 


 


Hgb 


 


Hct 


 


MCV 


 


MCH 


 


MCHC 


 


RDW 


 


Plt Count 


 


MPV 


 


Absolute Neuts (auto) 


 


Neutrophils % 


 


Lymphocytes % 


 


Monocytes % 


 


Eosinophils % 


 


Basophils % 


 


Nucleated RBC % 


 


Sodium  136


 


Potassium  4.7


 


Chloride  102


 


Carbon Dioxide  31


 


Anion Gap  4 L


 


BUN  8.1


 


Creatinine  0.5 L


 


Est GFR (CKD-EPI)AfAm  121.07


 


Est GFR (CKD-EPI)NonAf  104.46


 


Random Glucose  86


 


Calcium  9.0


 


Total Bilirubin  0.3


 


AST  70 H


 


ALT  85 H


 


Alkaline Phosphatase  80


 


Total Protein  5.8 L


 


Albumin  2.4 L


 


B. pertussis IgG Ab 


 


B. pertussis IgA Ab 


 


B. pertussis IgM Ab 


 


HIV 1&2 Ag/Ab, 4th Gen 








Active Medications











Generic Name Dose Route Start Last Admin





  Trade Name Freq  PRN Reason Stop Dose Admin


 


Acetaminophen  1,000 mg  07/14/19 20:55  07/14/19 21:46





  Ofirmev Injection -  IVPB   1,000 mg





  Q6H PRN   Administration





  HEADACHE   





     





     





     


 


Albuterol/Ipratropium  1 amp  07/14/19 12:53  07/16/19 20:35





  Duoneb -  NEB   1 amp





  Q6H PRN   Administration





  SHORTNESS OF BREATH   





     





     





     


 


Benzocaine/Menthol  1 each  07/14/19 07:33  





  Cepacol Lozenge -  MM   





  PRN PRN   





  SORE THROAT   





     





     





     


 


Enoxaparin Sodium  40 mg  07/14/19 10:00  07/17/19 09:53





  Lovenox -  SQ   Not Given





  DAILY MAX   





     





     





     





     


 


Azithromycin  500 mg in 250 mls @ 250 mls/hr  07/14/19 10:00  07/17/19 09:49





  Zithromax 500mg Ivpb (Pre-Docked)  IVPB   250 mls/hr





  DAILY MAX   Administration





     





     





     





     


 


Sodium Chloride  1,000 mls @ 75 mls/hr  07/14/19 13:45  07/16/19 09:49





  Normal Saline -  IV   75 mls/hr





  ASDIR MAX   Administration





     





     





     





     


 


Piperacillin Sod/Tazobactam  50 mls @ 100 mls/hr  07/15/19 10:00  07/17/19 09:49





  Sod 3.375 gm/ Dextrose  IVPB   100 mls/hr





  Q8H-IV MAX   Administration





     





     





  Protocol   





     


 


Ondansetron HCl  4 mg  07/16/19 15:45  





  Zofran Injection  IVPUSH   





  Q6H PRN   





  NAUSEA   





     





     





     











ASSESSMENT/PLAN:





This is a 64 y/o woman with PMH of heart murmur,anemia, ? hepatitis, and 

genital herpes who presented with fever, cough, and SOB x 1 week. She was found 

to be septic. 





#Community acquired PNA b/l/ sepsis


- continue zosyn (day 3) per ID, and azithro (day 4). 


- fluids continued 


- IgM mycoplasma pending


- HIV negative


- incentive spitrometry ordered


- Influenza ordered


- Stool O&P pending


- Blood parasite exam ordered 


- pt end[place] and traveling to [place] 1 yr 

ago. 


- pertussis serology negative for acute pertussis but positive for vaccination. 


- UA legionella, strep PNA negative


- procalcitonin pending


-  O2 as needed per NC. 


- CT results showed b/l PNA mostly in LLL, pleural effusions lt >rt. 


- repeat CT chest needed in 4-8 wks due to possible mass in lung. 


- follow up cbc bc recent neutropenia but slight increase to 3.3 from 3.1. 


Pulm - Dr. Schultz: keep SaO2 >90%, continue inhaled BD's, monitor lytes, continue 

IVF, F/U Cx's





#Group III Pulm HTN


- Echo- findings of TR suggests this is the cause of the JVD and pulm HTN (RV 

systolic pressure >25) 


- PNA is cause of HTN with some valvular involvement as well via the TR. 


- o/p PFT's, cardio followup. 





#Normocytic anemia


- continue to monitor as outpatient. No management at this time. 





FEN:


- on fluids velásquez d/c'd


- monitor all lytes


- on regular diet





Prophlyaxis: DVT lovenox 40g SQ. 


Dispo: stable on non-tele. 





Visit type





- Emergency Visit


Emergency Visit: No





- New Patient


This patient is new to me today: No





- Critical Care


Critical Care patient: No





- Discharge Referral


Referred to [facility] No





ATTENDING PHYSICIAN STATEMENT





I saw and evaluated the patient.


I reviewed the resident's note and discussed the case with the resident.


I agree with the resident's findings and plan as documented.








SUBJECTIVE:








OBJECTIVE:








ASSESSMENT AND PLAN:

## 2019-07-17 NOTE — PN
Teaching Attending Note


Name of Resident: Alverto Long





ATTENDING PHYSICIAN STATEMENT





I saw and evaluated the patient.


I reviewed the resident's note and discussed the case with the resident.


I agree with the resident's findings and plan as documented.








SUBJECTIVE:


Patient is comfortable with no acute distress, feels better 





OBJECTIVE:





 Vital Signs











Temperature  98.6 F   07/17/19 04:14


 


Pulse Rate  67   07/17/19 04:14


 


Respiratory Rate  18   07/17/19 08:57


 


Blood Pressure  116/64   07/17/19 04:14


 


O2 Sat by Pulse Oximetry (%)  96   07/17/19 08:57








GENERAL: The patient is awake, alert, and fully oriented, in no acute distress.


HEAD: Normal with no signs of trauma.


EYES: PERRL, extraocular movements intact, sclera anicteric, conjunctiva clear. 

. 


ENT: Ears normal,  oropharynx clear without exudates, moist mucous membranes.


NECK: Trachea midline, full range of motion, supple. 


LUNGS: decreased Breath sounds Bl , no wheezes, no crackles, no accessory 

muscle use. 


HEART: Regular rate and rhythm, S1, S2 positive, shanelle 2/6 , no rub or gallop.


ABDOMEN: Soft, nontender, nondistended, normoactive bowel sounds, no guarding, 

no rebound, no hepatosplenomegaly, no masses.


EXTREMITIES: 2+ pulses, warm, well-perfused, no edema. 


NEUROLOGICAL: Cranial nerves II through XII grossly intact. Normal speech, gait 

not observed.


PSYCH: Normal mood, normal affect.


SKIN: Warm, dry, normal turgor, no rashes or lesions noted


 CBCD











WBC  3.1 K/mm3 (4.0-10.0)  L  07/16/19  06:20    


 


RBC  2.96 M/mm3 (3.60-5.2)  L  07/16/19  06:20    


 


Hgb  9.6 GM/dL (10.7-15.3)  L  07/16/19  06:20    


 


Hct  27.9 % (32.4-45.2)  L  07/16/19  06:20    


 


MCV  94.2 fl (80-96)   07/16/19  06:20    


 


MCHC  34.4 g/dl (32.0-36.0)   07/16/19  06:20    


 


RDW  12.4 % (11.6-15.6)   07/16/19  06:20    


 


Plt Count  380 K/MM3 (134-434)   07/16/19  06:20    


 


MPV  7.0 fl (7.5-11.1)  L  07/16/19  06:20    








 CMP











Sodium  136 mmol/L (136-145)   07/16/19  06:20    


 


Potassium  4.1 mmol/L (3.5-5.1)   07/16/19  06:20    


 


Chloride  102 mmol/L ()   07/16/19  06:20    


 


Carbon Dioxide  30 mmol/L (21-32)   07/16/19  06:20    


 


Anion Gap  5 MMOL/L (8-16)  L  07/16/19  06:20    


 


BUN  6.2 mg/dL (7-18)  L  07/16/19  06:20    


 


Creatinine  0.4 mg/dL (0.55-1.3)  L  07/16/19  06:20    


 


Random Glucose  89 mg/dL ()   07/16/19  06:20    


 


Calcium  8.8 mg/dL (8.5-10.1)   07/16/19  06:20    


 


Total Bilirubin  0.3 mg/dL (0.2-1)   07/16/19  06:20    


 


AST  58 U/L (15-37)  H  07/16/19  06:20    


 


ALT  62 U/L (13-61)  H  07/16/19  06:20    


 


Alkaline Phosphatase  77 U/L ()   07/16/19  06:20    


 


Total Protein  5.5 g/dl (6.4-8.2)  L  07/16/19  06:20    


 


Albumin  2.3 g/dl (3.4-5.0)  L  07/16/19  06:20    








 CARDIAC ENZYMES











Troponin I  < 0.02 ng/ml (0.00-0.05)   07/13/19  11:00    








 Current Medications











Generic Name Dose Route Start Last Admin





  Trade Name Freq  PRN Reason Stop Dose Admin


 


Acetaminophen  1,000 mg  07/14/19 20:55  07/14/19 21:46





  Ofirmev Injection -  IVPB   1,000 mg





  Q6H PRN   Administration





  HEADACHE   





     





     





     


 


Albuterol/Ipratropium  1 amp  07/14/19 12:53  07/16/19 20:35





  Duoneb -  NEB   1 amp





  Q6H PRN   Administration





  SHORTNESS OF BREATH   





     





     





     


 


Benzocaine/Menthol  1 each  07/14/19 07:33  





  Cepacol Lozenge -  MM   





  PRN PRN   





  SORE THROAT   





     





     





     


 


Enoxaparin Sodium  40 mg  07/14/19 10:00  07/16/19 09:51





  Lovenox -  SQ   Not Given





  DAILY MAX   





     





     





     





     


 


Guaifenesin/Codeine Phosphate  10 ml  07/14/19 13:31  07/16/19 22:04





  Robitussin Ac -  PO   10 ml





  Q8H PRN   Administration





  COUGH   





     





     





     


 


Azithromycin  500 mg in 250 mls @ 250 mls/hr  07/14/19 10:00  07/16/19 09:48





  Zithromax 500mg Ivpb (Pre-Docked)  IVPB   250 mls/hr





  DAILY MAX   Administration





     





     





     





     


 


Sodium Chloride  1,000 mls @ 75 mls/hr  07/14/19 13:45  07/16/19 09:49





  Normal Saline -  IV   75 mls/hr





  ASDIR MAX   Administration





     





     





     





     


 


Piperacillin Sod/Tazobactam  50 mls @ 100 mls/hr  07/15/19 10:00  07/17/19 02:32





  Sod 3.375 gm/ Dextrose  IVPB   100 mls/hr





  Q8H-IV MXA   Administration





     





     





  Protocol   





     


 


Ondansetron HCl  4 mg  07/16/19 15:45  





  Zofran Injection  IVPUSH   





  Q6H PRN   





  NAUSEA   





     





     





     











ASSESSMENT AND PLAN:


Patient is a 64yo female with PMHx of heart murmur,anemia, and genital herpes 

who presented with fever, cough, and SOB x 1 week. she was found to  septic 





# Sepsis IV antibiotic zosyn continue


# CAP on Zpsyn continue


# Hyponatremia improved post IVF 


# Normocytic anemia


# Elevated BNP :not in heart failure clinically.


# MOd MR, TR, and Pulm HTN. patient notified 








DVT px. lovenox

## 2019-07-17 NOTE — PN
Progress Note (short form)





- Note


Progress Note: 





PULMONARY





Continues to feel better. +nonproductive cough. No fevers.





 Vital Signs











 Period  Temp  Pulse  Resp  BP Sys/Garza  Pulse Ox


 


 Last 24 Hr  98.2 F-98.6 F  67-83  18-20  113-128/64-80  96-98











Gen:  less tachypneic


Herat: RRR


Lung: left base rales, no wheezes


Abd: soft, nontender


Ext: no edema





 CBC, BMP





 07/17/19 08:05 





 07/17/19 08:05 





Active Medications





Acetaminophen (Ofirmev Injection -)  1,000 mg IVPB Q6H PRN


   PRN Reason: HEADACHE


   Last Admin: 07/14/19 21:46 Dose:  1,000 mg


Albuterol/Ipratropium (Duoneb -)  1 amp NEB Q6H PRN


   PRN Reason: SHORTNESS OF BREATH


   Last Admin: 07/16/19 20:35 Dose:  1 amp


Benzocaine/Menthol (Cepacol Lozenge -)  1 each MM PRN PRN


   PRN Reason: SORE THROAT


Enoxaparin Sodium (Lovenox -)  40 mg SQ DAILY MAX


   Last Admin: 07/17/19 09:53 Dose:  Not Given


Azithromycin (Zithromax 500mg Ivpb (Pre-Docked))  500 mg in 250 mls @ 250 mls/

hr IVPB DAILY MAX


   Last Admin: 07/17/19 09:49 Dose:  250 mls/hr


Sodium Chloride (Normal Saline -)  1,000 mls @ 75 mls/hr IV ASDIR MAX


   Last Admin: 07/17/19 15:36 Dose:  75 mls/hr


Piperacillin Sod/Tazobactam (Sod 3.375 gm/ Dextrose)  50 mls @ 100 mls/hr IVPB 

Q8H-IV MAX; Protocol


   Last Admin: 07/17/19 09:49 Dose:  100 mls/hr


Ondansetron HCl (Zofran Injection)  4 mg IVPUSH Q6H PRN


   PRN Reason: NAUSEA








A/P


Multilobar Pneumonia likely Community Acquired


Sepsis


Hyponatremia improving


Anemia





-  continue antibiotics per ID


-  f/u cultures


-  IVF


-  monitor lytes


-  inhaled bronchodilators


-  O2 to keep Spo2 >90%


-  encouraged ambulation


-  DVT prophylaxis

## 2019-07-18 LAB
ALBUMIN SERPL-MCNC: 2.5 G/DL (ref 3.4–5)
ALP SERPL-CCNC: 76 U/L (ref 45–117)
ALT SERPL-CCNC: 76 U/L (ref 13–61)
ANION GAP SERPL CALC-SCNC: 6 MMOL/L (ref 8–16)
AST SERPL-CCNC: 54 U/L (ref 15–37)
BASOPHILS # BLD: 1.5 % (ref 0–2)
BILIRUB SERPL-MCNC: 0.3 MG/DL (ref 0.2–1)
BUN SERPL-MCNC: 7.3 MG/DL (ref 7–18)
CALCIUM SERPL-MCNC: 8.9 MG/DL (ref 8.5–10.1)
CHLORIDE SERPL-SCNC: 104 MMOL/L (ref 98–107)
CO2 SERPL-SCNC: 27 MMOL/L (ref 21–32)
CREAT SERPL-MCNC: 0.5 MG/DL (ref 0.55–1.3)
DEPRECATED RDW RBC AUTO: 12.3 % (ref 11.6–15.6)
EOSINOPHIL # BLD: 5.8 % (ref 0–4.5)
GLUCOSE SERPL-MCNC: 88 MG/DL (ref 74–106)
HCT VFR BLD CALC: 29.5 % (ref 32.4–45.2)
HGB BLD-MCNC: 10 GM/DL (ref 10.7–15.3)
LYMPHOCYTES # BLD: 38.8 % (ref 8–40)
MCH RBC QN AUTO: 32.2 PG (ref 25.7–33.7)
MCHC RBC AUTO-ENTMCNC: 34 G/DL (ref 32–36)
MCV RBC: 94.7 FL (ref 80–96)
MONOCYTES # BLD AUTO: 9.5 % (ref 3.8–10.2)
NEUTROPHILS # BLD: 44.4 % (ref 42.8–82.8)
PLATELET # BLD AUTO: 536 K/MM3 (ref 134–434)
PMV BLD: 7.1 FL (ref 7.5–11.1)
POTASSIUM SERPLBLD-SCNC: 4.5 MMOL/L (ref 3.5–5.1)
PROT SERPL-MCNC: 5.9 G/DL (ref 6.4–8.2)
RBC # BLD AUTO: 3.11 M/MM3 (ref 3.6–5.2)
SODIUM SERPL-SCNC: 137 MMOL/L (ref 136–145)
WBC # BLD AUTO: 3.2 K/MM3 (ref 4–10)

## 2019-07-18 RX ADMIN — PIPERACILLIN SODIUM,TAZOBACTAM SODIUM SCH MLS/HR: 3; .375 INJECTION, POWDER, FOR SOLUTION INTRAVENOUS at 09:30

## 2019-07-18 RX ADMIN — ENOXAPARIN SODIUM SCH: 40 INJECTION SUBCUTANEOUS at 09:29

## 2019-07-18 RX ADMIN — SODIUM CHLORIDE SCH MLS/HR: 9 INJECTION, SOLUTION INTRAVENOUS at 06:06

## 2019-07-18 RX ADMIN — AZITHROMYCIN DIHYDRATE SCH MLS/HR: 500 INJECTION, POWDER, LYOPHILIZED, FOR SOLUTION INTRAVENOUS at 09:29

## 2019-07-18 RX ADMIN — PIPERACILLIN SODIUM,TAZOBACTAM SODIUM SCH MLS/HR: 3; .375 INJECTION, POWDER, FOR SOLUTION INTRAVENOUS at 01:28

## 2019-07-18 RX ADMIN — IPRATROPIUM BROMIDE AND ALBUTEROL SULFATE PRN AMP: .5; 3 SOLUTION RESPIRATORY (INHALATION) at 20:30

## 2019-07-18 RX ADMIN — SODIUM CHLORIDE SCH: 9 INJECTION, SOLUTION INTRAVENOUS at 13:50

## 2019-07-18 RX ADMIN — PIPERACILLIN SODIUM,TAZOBACTAM SODIUM SCH MLS/HR: 3; .375 INJECTION, POWDER, FOR SOLUTION INTRAVENOUS at 17:16

## 2019-07-18 NOTE — PN
Physical Exam: 


SUBJECTIVE: Patient seen and examined at bedside. Pt endorsed to having 

positive ppd and hemoptysis streaky a day ago with some night sweats and 

children who were dx and treated with Tb. 








OBJECTIVE:





 Vital Signs











 Period  Temp  Pulse  Resp  BP Sys/Garza  Pulse Ox


 


 Last 24 Hr  98.3 F-98.7 F  69-84  20-20  113-120/67-73  95-95











GENERAL: The patient is awake, alert, and fully oriented, in no acute distress.


HEAD: Normal with no signs of trauma.


NECK: Trachea midline, full range of motion, supple. 


LUNGS: Breath sounds reduced b/l, decreased wheezes, no crackles, no 


accessory muscle use. 


HEART: Regular rate and rhythm, S1, S2 without murmur, rub or gallop.


ABDOMEN: Soft, nontender, nondistended, normoactive bowel sounds, no guarding, 

no 


rebound.


EXTREMITIES: 2+ pulses, warm, well-perfused, no edema. 


NEUROLOGICAL: Cranial nerves II through XII grossly intact. Normal speech, gait 

not 


observed.


PSYCH: Normal mood, normal affect.


SKIN: Warm, dry, normal turgor, no rashes or lesions noted














 Laboratory Results - last 24 hr











  07/16/19 07/17/19 07/18/19





  06:20 15:45 07:00


 


WBC    3.2 L


 


RBC    3.11 L


 


Hgb    10.0 L


 


Hct    29.5 L


 


MCV    94.7


 


MCH    32.2


 


MCHC    34.0


 


RDW    12.3


 


Plt Count    536 H


 


MPV    7.1 L


 


Absolute Neuts (auto)    1.4 L


 


Neutrophils %    44.4


 


Lymphocytes %    38.8


 


Monocytes %    9.5


 


Eosinophils %    5.8 H


 


Basophils %    1.5


 


Nucleated RBC %    0


 


Sodium   


 


Potassium   


 


Chloride   


 


Carbon Dioxide   


 


Anion Gap   


 


BUN   


 


Creatinine   


 


Est GFR (CKD-EPI)AfAm   


 


Est GFR (CKD-EPI)NonAf   


 


Random Glucose   


 


Calcium   


 


Total Bilirubin   


 


AST   


 


ALT   


 


Alkaline Phosphatase   


 


Total Protein   


 


Albumin   


 


Procalcitonin  0.07  


 


Influenza A (Rapid)   Negative 


 


Influenza B (Rapid)   Negative 














  07/18/19





  07:00


 


WBC 


 


RBC 


 


Hgb 


 


Hct 


 


MCV 


 


MCH 


 


MCHC 


 


RDW 


 


Plt Count 


 


MPV 


 


Absolute Neuts (auto) 


 


Neutrophils % 


 


Lymphocytes % 


 


Monocytes % 


 


Eosinophils % 


 


Basophils % 


 


Nucleated RBC % 


 


Sodium  137


 


Potassium  4.5


 


Chloride  104


 


Carbon Dioxide  27


 


Anion Gap  6 L


 


BUN  7.3


 


Creatinine  0.5 L


 


Est GFR (CKD-EPI)AfAm  121.07


 


Est GFR (CKD-EPI)NonAf  104.46


 


Random Glucose  88


 


Calcium  8.9


 


Total Bilirubin  0.3


 


AST  54 H


 


ALT  76 H


 


Alkaline Phosphatase  76


 


Total Protein  5.9 L


 


Albumin  2.5 L


 


Procalcitonin 


 


Influenza A (Rapid) 


 


Influenza B (Rapid) 








Active Medications











Generic Name Dose Route Start Last Admin





  Trade Name Freq  PRN Reason Stop Dose Admin


 


Acetaminophen  1,000 mg  07/14/19 20:55  07/14/19 21:46





  Ofirmev Injection -  IVPB   1,000 mg





  Q6H PRN   Administration





  HEADACHE   





     





     





     


 


Albuterol/Ipratropium  1 amp  07/14/19 12:53  07/16/19 20:35





  Duoneb -  NEB   1 amp





  Q6H PRN   Administration





  SHORTNESS OF BREATH   





     





     





     


 


Benzocaine/Menthol  1 each  07/14/19 07:33  





  Cepacol Lozenge -  MM   





  PRN PRN   





  SORE THROAT   





     





     





     


 


Enoxaparin Sodium  40 mg  07/14/19 10:00  07/18/19 09:29





  Lovenox -  SQ   Not Given





  DAILY MAX   





     





     





     





     


 


Azithromycin  500 mg in 250 mls @ 250 mls/hr  07/14/19 10:00  07/18/19 09:29





  Zithromax 500mg Ivpb (Pre-Docked)  IVPB   250 mls/hr





  DAILY MAX   Administration





     





     





     





     


 


Sodium Chloride  1,000 mls @ 75 mls/hr  07/14/19 13:45  07/18/19 06:06





  Normal Saline -  IV   75 mls/hr





  ASDIR MAX   Administration





     





     





     





     


 


Piperacillin Sod/Tazobactam  50 mls @ 100 mls/hr  07/15/19 10:00  07/18/19 09:30





  Sod 3.375 gm/ Dextrose  IVPB   100 mls/hr





  Q8H-IV MAX   Administration





     





     





  Protocol   





     


 


Ondansetron HCl  4 mg  07/16/19 15:45  





  Zofran Injection  IVPUSH   





  Q6H PRN   





  NAUSEA   





     





     





     











ASSESSMENT/PLAN:





This is a 62 y/o woman with PMH of heart murmur,anemia, ? hepatitis, and 

genital herpes who presented with fever, cough, and SOB x 1 week. She was found 

to be septic. 





#Community acquired PNA b/l/ sepsis


- continue zosyn (day 4) per Dr. Flynn , and jennifer (day 5). 


- f/u B.C.'s and sputum gram stain (negative) and culture pending. No reason to 

do afb SPUTUM or quantifuron at this current juncture. Recommendations 

appreciated. 


- fluids continued 75 cc/hr. 


- IgM mycoplasma negative


- HIV negative


- continue incentive spirometry 


- Influenza negative


- Strongyloides pending.


- Stool O&P pending


- Blood parasite exam negative


- pt endorses traveling to [place] and traveling to [place] 1 yr 

ago.  


- procalcitonin 0.07 


-  O2 as needed per NC. 


- CT results showed b/l PNA mostly in LLL, pleural effusions lt >rt. 


- repeat CT chest needed in 4-8 wks due to possible mass in lung. 


- continue trending wbc (neutropenia). 


Pulm - Dr. Schultz: keep SaO2 >90%, continue inhaled BD's, monitor lytes, continue 

IVF, F/U Cx's, recommends f/u o/p chest imaging for unresolving infiltrate. 





#Group III Pulm HTN


- Echo- findings of TR suggests this is the cause of the JVD and pulm HTN (RV 

systolic pressure >25) 


- PNA is cause of HTN with some valvular involvement as well via the TR. 


- o/p PFT's, cardio followup. 





#Normocytic anemia


- continue to monitor as outpatient. No management at this time. 


- slight bump in platelets like reactive to infection (457->536)





FEN:


- on fluids 75ml/hr NS


- monitor all lytes


- on regular diet





Prophlyaxis: DVT lovenox 40g SQ. 


Dispo: stable on non-tele. 





Visit type





- Emergency Visit


Emergency Visit: No





- New Patient


This patient is new to me today: No





- Critical Care


Critical Care patient: No





- Discharge Referral


Referred to [facility] No





ATTENDING PHYSICIAN STATEMENT





I saw and evaluated the patient.


I reviewed the resident's note and discussed the case with the resident.


I agree with the resident's findings and plan as documented.








SUBJECTIVE:








OBJECTIVE:








ASSESSMENT AND PLAN:

## 2019-07-18 NOTE — PN
Teaching Attending Note


Name of Resident: Alverto Long





ATTENDING PHYSICIAN STATEMENT





I saw and evaluated the patient.


I reviewed the resident's note and discussed the case with the resident.


I agree with the resident's findings and plan as documented.








SUBJECTIVE:


Patient is comfortable with no acute distress, feels better , still mildly 

coughing.





OBJECTIVE:


 Vital Signs











Temperature  97.3 F L  07/18/19 18:31


 


Pulse Rate  77   07/18/19 18:31


 


Respiratory Rate  20   07/18/19 18:31


 


Blood Pressure  126/75   07/18/19 18:31


 


O2 Sat by Pulse Oximetry (%)  95   07/18/19 08:44








GENERAL: The patient is awake, alert, and fully oriented, in no acute distress.


HEAD: Normal with no signs of trauma.


EYES: PERRL, extraocular movements intact, sclera anicteric, conjunctiva clear. 

. 


ENT: Ears normal,  oropharynx clear without exudates, moist mucous membranes.


NECK: Trachea midline, full range of motion, supple. 


LUNGS: decreased Breath sounds Bl , left sided decreased air entery ,  no 

wheezes, no crackles, no accessory muscle use. 


HEART: Regular rate and rhythm, S1, S2 positive, shanelle 2/6 , no rub or gallop.


ABDOMEN: Soft, nontender, nondistended, normoactive bowel sounds, no guarding, 

no rebound, no hepatosplenomegaly, no masses.


EXTREMITIES: 2+ pulses, warm, well-perfused, no edema. 


NEUROLOGICAL: Cranial nerves II through XII grossly intact. Normal speech, gait 

is stable.


PSYCH: Normal mood, normal affect.


SKIN: Warm, dry, normal turgor, no rashes or lesions noted


 CBCD











WBC  3.2 K/mm3 (4.0-10.0)  L  07/18/19  07:00    


 


RBC  3.11 M/mm3 (3.60-5.2)  L  07/18/19  07:00    


 


Hgb  10.0 GM/dL (10.7-15.3)  L  07/18/19  07:00    


 


Hct  29.5 % (32.4-45.2)  L  07/18/19  07:00    


 


MCV  94.7 fl (80-96)   07/18/19  07:00    


 


MCHC  34.0 g/dl (32.0-36.0)   07/18/19  07:00    


 


RDW  12.3 % (11.6-15.6)   07/18/19  07:00    


 


Plt Count  536 K/MM3 (134-434)  H  07/18/19  07:00    


 


MPV  7.1 fl (7.5-11.1)  L  07/18/19  07:00    








 CMP











Sodium  137 mmol/L (136-145)   07/18/19  07:00    


 


Potassium  4.5 mmol/L (3.5-5.1)   07/18/19  07:00    


 


Chloride  104 mmol/L ()   07/18/19  07:00    


 


Carbon Dioxide  27 mmol/L (21-32)   07/18/19  07:00    


 


Anion Gap  6 MMOL/L (8-16)  L  07/18/19  07:00    


 


BUN  7.3 mg/dL (7-18)   07/18/19  07:00    


 


Creatinine  0.5 mg/dL (0.55-1.3)  L  07/18/19  07:00    


 


Random Glucose  88 mg/dL ()   07/18/19  07:00    


 


Calcium  8.9 mg/dL (8.5-10.1)   07/18/19  07:00    


 


Total Bilirubin  0.3 mg/dL (0.2-1)   07/18/19  07:00    


 


AST  54 U/L (15-37)  H  07/18/19  07:00    


 


ALT  76 U/L (13-61)  H  07/18/19  07:00    


 


Alkaline Phosphatase  76 U/L ()   07/18/19  07:00    


 


Total Protein  5.9 g/dl (6.4-8.2)  L  07/18/19  07:00    


 


Albumin  2.5 g/dl (3.4-5.0)  L  07/18/19  07:00    








 CARDIAC ENZYMES











Troponin I  < 0.02 ng/ml (0.00-0.05)   07/13/19  11:00    








 Current Medications











Generic Name Dose Route Start Last Admin





  Trade Name Freq  PRN Reason Stop Dose Admin


 


Acetaminophen  1,000 mg  07/14/19 20:55  07/14/19 21:46





  Ofirmev Injection -  IVPB   1,000 mg





  Q6H PRN   Administration





  HEADACHE   





     





     





     


 


Albuterol/Ipratropium  1 amp  07/14/19 12:53  07/16/19 20:35





  Duoneb -  NEB   1 amp





  Q6H PRN   Administration





  SHORTNESS OF BREATH   





     





     





     


 


Benzocaine/Menthol  1 each  07/14/19 07:33  





  Cepacol Lozenge -  MM   





  PRN PRN   





  SORE THROAT   





     





     





     


 


Enoxaparin Sodium  40 mg  07/14/19 10:00  07/18/19 09:29





  Lovenox -  SQ   Not Given





  DAILY MAX   





     





     





     





     


 


Guaifenesin  5 ml  07/18/19 17:06  





  Diabetic Tussin Dm -  PO   





  Q6H PRN   





  COUGH   





     





     





     


 


Azithromycin  500 mg in 250 mls @ 250 mls/hr  07/14/19 10:00  07/18/19 09:29





  Zithromax 500mg Ivpb (Pre-Docked)  IVPB   250 mls/hr





  DAILY MAX   Administration





     





     





     





     


 


Sodium Chloride  1,000 mls @ 75 mls/hr  07/14/19 13:45  07/18/19 13:50





  Normal Saline -  IV   Not Given





  ASDIR MAX   





     





     





     





     


 


Piperacillin Sod/Tazobactam  50 mls @ 100 mls/hr  07/15/19 10:00  07/18/19 17:16





  Sod 3.375 gm/ Dextrose  IVPB   100 mls/hr





  Q8H-IV MAX   Administration





     





     





  Protocol   





     


 


Ondansetron HCl  4 mg  07/16/19 15:45  





  Zofran Injection  IVPUSH   





  Q6H PRN   





  NAUSEA   





     





     





     








CT chest: extensive bl pneumonia , most marked within LLL. associated pleural 

effusions, left greater than right.





ASSESSMENT AND PLAN:


Patient is a 62yo female with PMHx of heart murmur,anemia, and genital herpes 

who presented with fever, cough, and SOB x 1 week. she was found to  septic 





# Sepsis due to having bl Pneumonia L>R continue IV antibiotic zosyn 


# BL CAP on Zpsyn continue


# Hyponatremia improved post IVF 


# Normocytic anemia with thrombocytosis reactive 


# Elevated BNP :not in heart failure clinically.


# MOd MR, TR, and Pulm HTN. patient notified ; follow up with repeat ECHO and 

CT of the chest once more stable. 





DVT px. lovenox

## 2019-07-18 NOTE — PN
Progress Note, Physician


History of Present Illness: 





patient stable


no new issues


said she has had hemoptysis


but feels better





- Current Medication List


Current Medications: 


Active Medications





Acetaminophen (Ofirmev Injection -)  1,000 mg IVPB Q6H PRN


   PRN Reason: HEADACHE


   Last Admin: 07/14/19 21:46 Dose:  1,000 mg


Albuterol/Ipratropium (Duoneb -)  1 amp NEB Q6H PRN


   PRN Reason: SHORTNESS OF BREATH


   Last Admin: 07/16/19 20:35 Dose:  1 amp


Benzocaine/Menthol (Cepacol Lozenge -)  1 each MM PRN PRN


   PRN Reason: SORE THROAT


Enoxaparin Sodium (Lovenox -)  40 mg SQ DAILY MAX


   Last Admin: 07/18/19 09:29 Dose:  Not Given


Azithromycin (Zithromax 500mg Ivpb (Pre-Docked))  500 mg in 250 mls @ 250 mls/

hr IVPB DAILY MAX


   Last Admin: 07/18/19 09:29 Dose:  250 mls/hr


Sodium Chloride (Normal Saline -)  1,000 mls @ 75 mls/hr IV ASDIR MAX


   Last Admin: 07/18/19 06:06 Dose:  75 mls/hr


Piperacillin Sod/Tazobactam (Sod 3.375 gm/ Dextrose)  50 mls @ 100 mls/hr IVPB 

Q8H-IV MAX; Protocol


   Last Admin: 07/18/19 09:30 Dose:  100 mls/hr


Ondansetron HCl (Zofran Injection)  4 mg IVPUSH Q6H PRN


   PRN Reason: NAUSEA











- Objective


Vital Signs: 


 Vital Signs











Temperature  98.4 F   07/18/19 09:34


 


Pulse Rate  84   07/18/19 09:34


 


Respiratory Rate  20   07/18/19 09:34


 


Blood Pressure  120/67   07/18/19 09:34


 


O2 Sat by Pulse Oximetry (%)  95   07/18/19 08:44











Constitutional: Yes: No Distress, Calm


Cardiovascular: Yes: S1, S2


Respiratory: Yes: Regular, CTA Bilaterally


Gastrointestinal: Yes: Normal Bowel Sounds, Soft


Musculoskeletal: Yes: WNL


Extremities: Yes: WNL


Neurological: Yes: Alert, Oriented


Psychiatric: Yes: Alert, Oriented


Labs: 


 CBC, BMP





 07/18/19 07:00 





 07/18/19 07:00 





 INR, PTT











INR  1.19  (0.83-1.09)  H  07/13/19  11:00    














Assessment/Plan





pneumonia


fever


weakness





plan


continue current abx


incentive stone


rest as per the team


sputum cx send will await for the results

## 2019-07-18 NOTE — PN
Progress Note (short form)





- Note


Progress Note: 





PULMONARY





Continues to feel better but had streaky hemoptysis yesterday. None today. No 

fevers.





 Vital Signs











 Period  Temp  Pulse  Resp  BP Sys/Garza  Pulse Ox


 


 Last 24 Hr  97.7 F-98.7 F  69-84  20-20  115-122/67-80  95-95











Gen:  less tachypneic


Herat: RRR


Lung: left base rales, no wheezes


Abd: soft, nontender


Ext: no edema





 CBC, BMP





 07/18/19 07:00 





 07/18/19 07:00 





Active Medications





Acetaminophen (Ofirmev Injection -)  1,000 mg IVPB Q6H PRN


   PRN Reason: HEADACHE


   Last Admin: 07/14/19 21:46 Dose:  1,000 mg


Albuterol/Ipratropium (Duoneb -)  1 amp NEB Q6H PRN


   PRN Reason: SHORTNESS OF BREATH


   Last Admin: 07/16/19 20:35 Dose:  1 amp


Benzocaine/Menthol (Cepacol Lozenge -)  1 each MM PRN PRN


   PRN Reason: SORE THROAT


Enoxaparin Sodium (Lovenox -)  40 mg SQ DAILY MAX


   Last Admin: 07/18/19 09:29 Dose:  Not Given


Azithromycin (Zithromax 500mg Ivpb (Pre-Docked))  500 mg in 250 mls @ 250 mls/

hr IVPB DAILY MAX


   Last Admin: 07/18/19 09:29 Dose:  250 mls/hr


Sodium Chloride (Normal Saline -)  1,000 mls @ 75 mls/hr IV ASDIR MAX


   Last Admin: 07/18/19 06:06 Dose:  75 mls/hr


Piperacillin Sod/Tazobactam (Sod 3.375 gm/ Dextrose)  50 mls @ 100 mls/hr IVPB 

Q8H-IV MAX; Protocol


   Last Admin: 07/18/19 09:30 Dose:  100 mls/hr


Ondansetron HCl (Zofran Injection)  4 mg IVPUSH Q6H PRN


   PRN Reason: NAUSEA








A/P


Multilobar Pneumonia likely Community Acquired


Sepsis


Hyponatremia improving


Anemia





-  continue antibiotics per ID


-  f/u cultures


-  IVF


-  monitor lytes


-  inhaled bronchodilators


-  O2 to keep Spo2 >90%


-  encouraged ambulation


-  DVT prophylaxis


-  outpt f/u of chest imaging to ensure resolution of infiltrates

## 2019-07-19 VITALS — TEMPERATURE: 97.3 F

## 2019-07-19 VITALS — HEART RATE: 62 BPM | SYSTOLIC BLOOD PRESSURE: 115 MMHG | DIASTOLIC BLOOD PRESSURE: 62 MMHG

## 2019-07-19 LAB
ALBUMIN SERPL-MCNC: 2.7 G/DL (ref 3.4–5)
ALP SERPL-CCNC: 78 U/L (ref 45–117)
ALT SERPL-CCNC: 76 U/L (ref 13–61)
ANION GAP SERPL CALC-SCNC: 4 MMOL/L (ref 8–16)
AST SERPL-CCNC: 55 U/L (ref 15–37)
BASOPHILS # BLD: 1.6 % (ref 0–2)
BILIRUB SERPL-MCNC: 0.4 MG/DL (ref 0.2–1)
BUN SERPL-MCNC: 7.8 MG/DL (ref 7–18)
CALCIUM SERPL-MCNC: 9 MG/DL (ref 8.5–10.1)
CHLORIDE SERPL-SCNC: 105 MMOL/L (ref 98–107)
CO2 SERPL-SCNC: 29 MMOL/L (ref 21–32)
CREAT SERPL-MCNC: 0.6 MG/DL (ref 0.55–1.3)
DEPRECATED RDW RBC AUTO: 12.4 % (ref 11.6–15.6)
EOSINOPHIL # BLD: 4.5 % (ref 0–4.5)
GLUCOSE SERPL-MCNC: 85 MG/DL (ref 74–106)
HCT VFR BLD CALC: 30.3 % (ref 32.4–45.2)
HGB BLD-MCNC: 10.3 GM/DL (ref 10.7–15.3)
LYMPHOCYTES # BLD: 41.1 % (ref 8–40)
MCH RBC QN AUTO: 32 PG (ref 25.7–33.7)
MCHC RBC AUTO-ENTMCNC: 34.1 G/DL (ref 32–36)
MCV RBC: 94 FL (ref 80–96)
MONOCYTES # BLD AUTO: 8.7 % (ref 3.8–10.2)
NEUTROPHILS # BLD: 44.1 % (ref 42.8–82.8)
PLATELET # BLD AUTO: 615 K/MM3 (ref 134–434)
PLATELET BLD QL SMEAR: (no result)
PMV BLD: 7.2 FL (ref 7.5–11.1)
POTASSIUM SERPLBLD-SCNC: 4.8 MMOL/L (ref 3.5–5.1)
PROT SERPL-MCNC: 6.2 G/DL (ref 6.4–8.2)
RBC # BLD AUTO: 3.22 M/MM3 (ref 3.6–5.2)
SODIUM SERPL-SCNC: 138 MMOL/L (ref 136–145)
WBC # BLD AUTO: 3.3 K/MM3 (ref 4–10)

## 2019-07-19 RX ADMIN — PIPERACILLIN SODIUM,TAZOBACTAM SODIUM SCH MLS/HR: 3; .375 INJECTION, POWDER, FOR SOLUTION INTRAVENOUS at 09:13

## 2019-07-19 RX ADMIN — SODIUM CHLORIDE SCH MLS/HR: 9 INJECTION, SOLUTION INTRAVENOUS at 10:39

## 2019-07-19 RX ADMIN — AZITHROMYCIN DIHYDRATE SCH: 500 INJECTION, POWDER, LYOPHILIZED, FOR SOLUTION INTRAVENOUS at 10:36

## 2019-07-19 RX ADMIN — SODIUM CHLORIDE SCH MLS/HR: 9 INJECTION, SOLUTION INTRAVENOUS at 01:43

## 2019-07-19 RX ADMIN — PIPERACILLIN SODIUM,TAZOBACTAM SODIUM SCH MLS/HR: 3; .375 INJECTION, POWDER, FOR SOLUTION INTRAVENOUS at 01:42

## 2019-07-19 RX ADMIN — ENOXAPARIN SODIUM SCH: 40 INJECTION SUBCUTANEOUS at 09:12

## 2019-07-19 NOTE — DS
Physical Exam: 


SUBJECTIVE: Patient seen and examined








OBJECTIVE:





 Vital Signs











 Period  Temp  Pulse  Resp  BP Sys/Garza  Pulse Ox


 


 Last 24 Hr  97.3 F-98.1 F  62-90  20-20  106-128/62-75  95-97








PHYSICAL EXAM





GENERAL: The patient is awake, alert, and fully oriented, in no acute distress.


HEAD: Normal with no signs of trauma.


EYES: PERRL, extraocular movements intact, sclera anicteric, conjunctiva clear. 


ENT: Ears normal, nares patent, oropharynx clear without exudates, moist mucous 

membranes.


NECK: Trachea midline, full range of motion, supple. 


LUNGS: Breath sounds equal, clear to auscultation bilaterally, no wheezes, no 

crackles, no accessory muscle use. 


HEART: Regular rate and rhythm, S1, S2 without murmur, rub or gallop.


ABDOMEN: Soft, nontender, nondistended, normoactive bowel sounds, no guarding, 

no rebound, no hepatosplenomegaly, no masses.


EXTREMITIES: 2+ pulses, warm, well-perfused, no edema. 


NEUROLOGICAL: Cranial nerves II through XII grossly intact. Normal speech, gait 

not observed.


PSYCH: Normal mood, normal affect.


SKIN: Warm, dry, normal turgor, no rashes or lesions noted.





LABS


 Laboratory Results - last 24 hr











  07/17/19 07/19/19 07/19/19





  11:44 06:35 06:35


 


WBC   3.3 L 


 


RBC   3.22 L 


 


Hgb   10.3 L 


 


Hct   30.3 L 


 


MCV   94.0 


 


MCH   32.0 


 


MCHC   34.1 


 


RDW   12.4 


 


Plt Count   615 H 


 


MPV   7.2 L 


 


Absolute Neuts (auto)   1.5 


 


Neutrophils %   44.1 


 


Lymphocytes %   41.1 H 


 


Monocytes %   8.7 


 


Eosinophils %   4.5 


 


Basophils %   1.6 


 


Nucleated RBC %   0 


 


Platelet Estimate   Increased 


 


Platelet Comment   Sslide reviewed 


 


Sodium    138


 


Potassium    4.8


 


Chloride    105


 


Carbon Dioxide    29


 


Anion Gap    4 L


 


BUN    7.8


 


Creatinine    0.6


 


Est GFR (CKD-EPI)AfAm    114.02


 


Est GFR (CKD-EPI)NonAf    98.38


 


Random Glucose    85


 


Calcium    9.0


 


Total Bilirubin    0.4


 


AST    55 H


 


ALT    76 H


 


Alkaline Phosphatase    78


 


Total Protein    6.2 L


 


Albumin    2.7 L


 


Strongyloides IgG Ab  Negative  











 Microbiology





07/17/19 18:15   Sputum - Expectorated   Gram Stain - Final


07/17/19 18:15   Sputum - Expectorated   Sputum Culture - Preliminary


                            NORMAL RESPIRATORY ALEXEI


07/14/19 07:52   Blood - Peripheral Venous   Blood Culture - Final


                            NO GROWTH AFTER 5 DAYS INCUBATION


07/14/19 07:52   Blood - Peripheral Venous   Blood Culture - Final


                            NO GROWTH AFTER 5 DAYS INCUBATION


07/13/19 11:00   Blood - Peripheral Venous   Blood Culture - Final


                            NO GROWTH AFTER 5 DAYS INCUBATION


07/13/19 11:00   Blood - Peripheral Venous   Blood Culture - Final


                            NO GROWTH AFTER 5 DAYS INCUBATION


07/15/19 11:55   Serum   Mycoplasma Antibody - Final


07/17/19 11:44   Blood - Peripheral Venous   Blood Parasites Smear - Final


07/14/19 13:30   Urine For Antigen Detection   Legionella Antigen - Final


07/14/19 13:30   Urine For Antigen Detection   Streptococcus pneumoniae Antigen 

(M - Final


07/13/19 11:20   Urine - Urine Clean Catch   Urine Culture - Final


                            Lactose Fermenting Neg Bacilli




















HOSPITAL COURSE:





Date of Admission:07/13/19


Images:





CT: Extensive bilateral pneumonia, most marked within the left lower lobe. 

There are associated pleural effusions, left greater than right. Clinical 

correlation and follow-up recommended.





CXR: Extensive bilateral lobar pneumonia, clinical correlation. 





Echo: Findings of TR suggests this is the cause of the JVD and pulm HTN (RV 

systolic pressure >25) 





This is a 64 y/o F with no PMH that was admitted for b/l PNA and nonexudative 

cough. She was placed on zosyn and azithromycin for 5 days and patient 

improved. She had a recent rise in her platelets so most likely reactive 

thrombocytosis but pt was asked to f/u with her PCP in 1 week to have a repeat 

cbc. Pt is to follow up with her pulmonologist in a week and to set up 

appointment for repeat CT chest in 6-8 weeks. Patient is continuing management 

with augmentin 2g BID for 4 days starting 7/20. 





Date of Discharge: 07/19/19











Minutes to complete discharge: 35





Discharge Summary


Reason For Visit: COMMUNITY ACQUIRED BACTERIAL PNEUMONIA


Condition: Stable





- Instructions


Diet, Activity, Other Instructions: 








- You were admitted for having a lung infection; Pneumonia. We treated the 

infection with antibiotics. While you were here we found one of your blood 

levels to be high (platelets) so please have your primary care doctor repeat 

your blood work (cbc) when you see your Primary care doctor. 





Medication to complete treatment of your pneumonia:





Augmentin XR 2gram by mouth twice daily for 4 days. Take Bacid 2x per day.





- We did various imaging (chest CT and chest xray) while you were here.





- As per your lung doctor (Dr. Zelaya) you are to repeat your chest CT scan in 6-

8 weeks. 





- You should follow up with your primary care doctor in a week. 


- Please follow up with your infectious disease doctor (Dr. Flynn ) in 1 week. 


 


- Return to the emergency room if you have worsening of your symptoms: fever, 

cough, chest pain, coughing up blood, or shortness of breath.


Referrals: 


Nelli Flynn MD [Staff Physician] - 1 Week


Nikita Zelaya MD [Staff Physician] - 1 Week


Christen Jimenez MD [Primary Care Provider] - 07/22/19


Disposition: HOME





- Home Medications


Comprehensive Discharge Medication List: 


Ambulatory Orders





Acidoph/L.bulg/Bif.b/S.thermop [Bacid Caplet] 1 each PO BID #60 tablet 07/19/19 


Amoxicillin/Potassium Clav [Augmentin 500-125 Tablet] 4 each PO BID 4 Days #32 

tablet 07/19/19 








This patient is new to me today: No


Emergency Visit: No


Critical Care patient: No





- Discharge Referral


Referred to [facility] No





ATTENDING PHYSICIAN STATEMENT





I saw and evaluated the patient.


I reviewed the resident's note and discussed the case with the resident.


I agree with the resident's findings and plan as documented.








SUBJECTIVE:








OBJECTIVE:








ASSESSMENT AND PLAN:

## 2019-07-19 NOTE — PN
Progress Note (short form)





- Note


Progress Note: 


Continues to feel better.  Minimal dark hemoptysis this AM. 


No SOB. 





 Intake & Output











 07/16/19 07/17/19 07/18/19 07/19/19





 23:59 23:59 23:59 23:59


 


Intake Total 2250 1440 3175 1475


 


Output Total 4300 2300 803 603


 


Balance -2050 -860 2372 872








 Last Vital Signs











Temp Pulse Resp BP Pulse Ox


 


 97.3 F L  69   20   106/65   97 


 


 07/19/19 06:40  07/19/19 06:40  07/19/19 06:40  07/19/19 06:40  07/19/19 09:00








Active Medications





Acetaminophen (Ofirmev Injection -)  1,000 mg IVPB Q6H PRN


   PRN Reason: HEADACHE


   Last Admin: 07/14/19 21:46 Dose:  1,000 mg


Benzocaine/Menthol (Cepacol Lozenge -)  1 each MM PRN PRN


   PRN Reason: SORE THROAT


Enoxaparin Sodium (Lovenox -)  40 mg SQ DAILY MAX


   Last Admin: 07/19/19 09:12 Dose:  Not Given


Guaifenesin (Diabetic Tussin Dm -)  5 ml PO Q6H PRN


   PRN Reason: COUGH


Sodium Chloride (Normal Saline -)  1,000 mls @ 75 mls/hr IV ASDIR MAX


   Last Admin: 07/19/19 10:39 Dose:  75 mls/hr


Piperacillin Sod/Tazobactam (Sod 3.375 gm/ Dextrose)  50 mls @ 100 mls/hr IVPB 

Q8H-IV MAX; Protocol


   Last Admin: 07/19/19 09:13 Dose:  100 mls/hr


Ondansetron HCl (Zofran Injection)  4 mg IVPUSH Q6H PRN


   PRN Reason: NAUSEA











Gen:  less tachypneic


Herat: RRR


Lung: left base rales, no wheezes


Abd: soft, nontender


Ext: no edema





 Laboratory Results - last 24 hr











  07/17/19 07/19/19 07/19/19





  11:44 06:35 06:35


 


WBC   3.3 L 


 


RBC   3.22 L 


 


Hgb   10.3 L 


 


Hct   30.3 L 


 


MCV   94.0 


 


MCH   32.0 


 


MCHC   34.1 


 


RDW   12.4 


 


Plt Count   615 H 


 


MPV   7.2 L 


 


Absolute Neuts (auto)   1.5 


 


Neutrophils %   44.1 


 


Lymphocytes %   41.1 H 


 


Monocytes %   8.7 


 


Eosinophils %   4.5 


 


Basophils %   1.6 


 


Nucleated RBC %   0 


 


Platelet Estimate   Increased 


 


Platelet Comment   Sslide reviewed 


 


Sodium    138


 


Potassium    4.8


 


Chloride    105


 


Carbon Dioxide    29


 


Anion Gap    4 L


 


BUN    7.8


 


Creatinine    0.6


 


Est GFR (CKD-EPI)AfAm    114.02


 


Est GFR (CKD-EPI)NonAf    98.38


 


Random Glucose    85


 


Calcium    9.0


 


Total Bilirubin    0.4


 


AST    55 H


 


ALT    76 H


 


Alkaline Phosphatase    78


 


Total Protein    6.2 L


 


Albumin    2.7 L


 


Strongyloides IgG Ab  Negative  














A/P


Multilobar Pneumonia likely Community Acquired


Sepsis


Hyponatremia improving


Anemia





Can change to PO ABX 


No smoking 


CT chest in September 





Dr Cruz

## 2019-07-19 NOTE — PN
Teaching Attending Note


Name of Resident: Alverto Long





ATTENDING PHYSICIAN STATEMENT





I saw and evaluated the patient.


I reviewed the resident's note and discussed the case with the resident.


I agree with the resident's findings and plan as documented.








SUBJECTIVE:


Patient is feeling better with no acute distress, slight coughing this morning 

with mild hemoptysis. 


OBJECTIVE:





 Vital Signs











Temperature  97.3 F L  07/19/19 15:00


 


Pulse Rate  62   07/19/19 15:00


 


Respiratory Rate  20   07/19/19 15:00


 


Blood Pressure  115/62   07/19/19 15:00


 


O2 Sat by Pulse Oximetry (%)  97   07/19/19 09:00








GENERAL: The patient is awake, alert, and fully oriented, in no acute distress.


HEAD: Normal with no signs of trauma.


EYES: PERRL, extraocular movements intact, sclera anicteric, conjunctiva clear.

  


ENT: Ears normal,  oropharynx clear without exudates, moist mucous membranes.


NECK: Trachea midline, full range of motion, supple. 


LUNGS: decreased Breath sounds Bl , no wheezes, no crackles, no accessory 

muscle use. 


HEART: Regular rate and rhythm, S1, S2 positive, shanelle 2/6 , no rub or gallop.


ABDOMEN: Soft, NT,ND, normoactive bowel sounds, no guarding, no rebound, no 

hepatosplenomegaly, no masses.


EXTREMITIES: 2+ pulses, warm, well-perfused, no edema. 


NEUROLOGICAL: Cranial nerves II through XII grossly intact. Normal speech, gait 

not observed.


PSYCH: Normal mood, normal affect.


SKIN: Warm, dry, normal turgor, no rashes or lesions noted


 


 CBCD











WBC  3.3 K/mm3 (4.0-10.0)  L  07/19/19  06:35    


 


RBC  3.22 M/mm3 (3.60-5.2)  L  07/19/19  06:35    


 


Hgb  10.3 GM/dL (10.7-15.3)  L  07/19/19  06:35    


 


Hct  30.3 % (32.4-45.2)  L  07/19/19  06:35    


 


MCV  94.0 fl (80-96)   07/19/19  06:35    


 


MCHC  34.1 g/dl (32.0-36.0)   07/19/19  06:35    


 


RDW  12.4 % (11.6-15.6)   07/19/19  06:35    


 


Plt Count  615 K/MM3 (134-434)  H  07/19/19  06:35    


 


MPV  7.2 fl (7.5-11.1)  L  07/19/19  06:35    








 CMP











Sodium  138 mmol/L (136-145)   07/19/19  06:35    


 


Potassium  4.8 mmol/L (3.5-5.1)   07/19/19  06:35    


 


Chloride  105 mmol/L ()   07/19/19  06:35    


 


Carbon Dioxide  29 mmol/L (21-32)   07/19/19  06:35    


 


Anion Gap  4 MMOL/L (8-16)  L  07/19/19  06:35    


 


BUN  7.8 mg/dL (7-18)   07/19/19  06:35    


 


Creatinine  0.6 mg/dL (0.55-1.3)   07/19/19  06:35    


 


Random Glucose  85 mg/dL ()   07/19/19  06:35    


 


Calcium  9.0 mg/dL (8.5-10.1)   07/19/19  06:35    


 


Total Bilirubin  0.4 mg/dL (0.2-1)   07/19/19  06:35    


 


AST  55 U/L (15-37)  H  07/19/19  06:35    


 


ALT  76 U/L (13-61)  H  07/19/19  06:35    


 


Alkaline Phosphatase  78 U/L ()   07/19/19  06:35    


 


Total Protein  6.2 g/dl (6.4-8.2)  L  07/19/19  06:35    


 


Albumin  2.7 g/dl (3.4-5.0)  L  07/19/19  06:35    








 CARDIAC ENZYMES











Troponin I  < 0.02 ng/ml (0.00-0.05)   07/13/19  11:00    











 Home Medications











 Medication  Instructions  Recorded


 


Acidoph/L.bulg/Bif.b/S.thermop 1 each PO BID #60 tablet 07/19/19





[Bacid Caplet]  


 


Amoxicillin/Potassium Clav 4 each PO BID 4 Days #32 tablet 07/19/19





[Augmentin 500-125 Tablet]  








ASSESSMENT AND PLAN:


Patient is a 62yo female with PMHx of heart murmur,anemia, and genital herpes 

who presented with fever, cough, and SOB x 1 week. she was found to  septic 


CT chest: extensive bl pneumonia , most marked within LLL. associated pleural 

effusions, left greater than right.





# Sepsis due to having bl Pneumonia L>R continue, as per ID and pulmonary 

patient can be discharged home on oral antibiotic 2gm of oral Augmentin bid and 

follow with her primary care MD on Monday , also patient needs to repeat the CT 

of the chest in 6-8 weeks and follow up with pulmonary as well and ID within a 

a week period. 


# BL CAP ,s/p Zosyn , will discharge the patient home on Augmentin as per ID 


# Hyponatremia improved post IVF 


# Normocytic anemia with thrombocytosis reactive 


# Elevated BNP :not in heart failure clinically.


# MOd MR, TR, and Pulm HTN. patient notified ; follow up with repeat ECHO once 

more stable and CT of the chest within 6-8 weeks once more stable. 





DVT px. lovenox

## 2019-07-19 NOTE — PN
Progress Note, Physician


History of Present Illness: 





Pt seen and examined, events noted. Pt states she is feeling much better, cough 

resolving, minimal amt blood streaked sputum, no SOB and reports ambulating 

without SOB. Wishes to go home.





- Current Medication List


Current Medications: 


Active Medications





Acetaminophen (Ofirmev Injection -)  1,000 mg IVPB Q6H PRN


   PRN Reason: HEADACHE


   Last Admin: 07/14/19 21:46 Dose:  1,000 mg


Benzocaine/Menthol (Cepacol Lozenge -)  1 each MM PRN PRN


   PRN Reason: SORE THROAT


Enoxaparin Sodium (Lovenox -)  40 mg SQ DAILY MAX


   Last Admin: 07/19/19 09:12 Dose:  Not Given


Guaifenesin (Diabetic Tussin Dm -)  5 ml PO Q6H PRN


   PRN Reason: COUGH


Sodium Chloride (Normal Saline -)  1,000 mls @ 75 mls/hr IV ASDIR MAX


   Last Admin: 07/19/19 10:39 Dose:  75 mls/hr


Piperacillin Sod/Tazobactam (Sod 3.375 gm/ Dextrose)  50 mls @ 100 mls/hr IVPB 

Q8H-IV MAX; Protocol


   Last Admin: 07/19/19 09:13 Dose:  100 mls/hr


Ondansetron HCl (Zofran Injection)  4 mg IVPUSH Q6H PRN


   PRN Reason: NAUSEA











- Objective


Vital Signs: 


 Vital Signs











Temperature  97.3 F L  07/19/19 06:40


 


Pulse Rate  69   07/19/19 06:40


 


Respiratory Rate  20   07/19/19 06:40


 


Blood Pressure  106/65   07/19/19 06:40


 


O2 Sat by Pulse Oximetry (%)  97   07/19/19 09:00











Constitutional: Yes: No Distress, Calm


Eyes: Yes: Conjunctiva Clear


Cardiovascular: Yes: Regular Rate and Rhythm


Respiratory: Yes: Wheezes


Gastrointestinal: Yes: Normal Bowel Sounds, Soft


Genitourinary: Yes: WNL


Extremities: Yes: WNL


Edema: No


Integumentary: Yes: WNL


Neurological: Yes: Alert, Oriented


Labs: 


 CBC, BMP





 07/19/19 06:35 





 07/19/19 06:35 





 INR, PTT











INR  1.19  (0.83-1.09)  H  07/13/19  11:00    








 Microbiology





07/17/19 18:15   Sputum - Expectorated   Gram Stain - Final


07/17/19 18:15   Sputum - Expectorated   Sputum Culture - Preliminary


                            NORMAL RESPIRATORY ALEXEI


07/14/19 07:52   Blood - Peripheral Venous   Blood Culture - Final


                            NO GROWTH AFTER 5 DAYS INCUBATION


07/14/19 07:52   Blood - Peripheral Venous   Blood Culture - Final


                            NO GROWTH AFTER 5 DAYS INCUBATION


07/13/19 11:00   Blood - Peripheral Venous   Blood Culture - Final


                            NO GROWTH AFTER 5 DAYS INCUBATION


07/13/19 11:00   Blood - Peripheral Venous   Blood Culture - Final


                            NO GROWTH AFTER 5 DAYS INCUBATION


07/15/19 11:55   Serum   Mycoplasma Antibody - Final


07/17/19 11:44   Blood - Peripheral Venous   Blood Parasites Smear - Final


07/14/19 13:30   Urine For Antigen Detection   Legionella Antigen - Final


07/14/19 13:30   Urine For Antigen Detection   Streptococcus pneumoniae Antigen 

(M - Final


07/13/19 11:20   Urine - Urine Clean Catch   Urine Culture - Final


                            Lactose Fermenting Neg Bacilli











- ....Imaging


Cat Scan: Report Reviewed





Problem List





- Problems


(1) Community acquired bacterial pneumonia


Code(s): J15.9 - UNSPECIFIED BACTERIAL PNEUMONIA   





Assessment/Plan





CAP


s/p Sepsis





-- clinically improving, afebrile


-- if d/c pt home suggest Augmentin XR 2G po BID x 4 days, bronchodilators


-- f/u with Dr. Flynn in one wk


-- monitor cbc


-- Pulmonary following


-- repeat CT as outpt


after d/c pt instructed to seek immediate medical attention if symptoms worsen, 

develops fever/abd pain/diarrhea

## 2021-02-25 ENCOUNTER — HOSPITAL ENCOUNTER (OUTPATIENT)
Dept: HOSPITAL 74 - JASU-ENDO | Age: 66
Discharge: HOME | End: 2021-02-25
Attending: INTERNAL MEDICINE
Payer: COMMERCIAL

## 2021-02-25 VITALS — TEMPERATURE: 97.5 F

## 2021-02-25 VITALS — BODY MASS INDEX: 20.9 KG/M2

## 2021-02-25 VITALS — SYSTOLIC BLOOD PRESSURE: 120 MMHG | DIASTOLIC BLOOD PRESSURE: 85 MMHG | HEART RATE: 66 BPM

## 2021-02-25 DIAGNOSIS — Z12.11: Primary | ICD-10-CM

## 2021-02-25 DIAGNOSIS — K64.8: ICD-10-CM

## 2021-02-25 DIAGNOSIS — Z80.0: ICD-10-CM

## 2021-02-25 PROCEDURE — 0DJD8ZZ INSPECTION OF LOWER INTESTINAL TRACT, VIA NATURAL OR ARTIFICIAL OPENING ENDOSCOPIC: ICD-10-PCS | Performed by: INTERNAL MEDICINE
